# Patient Record
Sex: MALE | Race: WHITE | NOT HISPANIC OR LATINO | Employment: UNEMPLOYED | URBAN - METROPOLITAN AREA
[De-identification: names, ages, dates, MRNs, and addresses within clinical notes are randomized per-mention and may not be internally consistent; named-entity substitution may affect disease eponyms.]

---

## 2024-10-20 ENCOUNTER — APPOINTMENT (OUTPATIENT)
Dept: RADIOLOGY | Facility: HOSPITAL | Age: 59
End: 2024-10-20
Payer: MEDICAID

## 2024-10-20 ENCOUNTER — APPOINTMENT (OUTPATIENT)
Dept: CARDIOLOGY | Facility: HOSPITAL | Age: 59
End: 2024-10-20
Payer: MEDICAID

## 2024-10-20 ENCOUNTER — HOSPITAL ENCOUNTER (EMERGENCY)
Facility: HOSPITAL | Age: 59
Discharge: HOME | End: 2024-10-20
Payer: MEDICAID

## 2024-10-20 VITALS
RESPIRATION RATE: 19 BRPM | HEART RATE: 59 BPM | SYSTOLIC BLOOD PRESSURE: 120 MMHG | TEMPERATURE: 97.9 F | HEIGHT: 65 IN | BODY MASS INDEX: 24.16 KG/M2 | OXYGEN SATURATION: 97 % | WEIGHT: 145 LBS | DIASTOLIC BLOOD PRESSURE: 69 MMHG

## 2024-10-20 DIAGNOSIS — F10.90 ALCOHOL USE DISORDER: Primary | ICD-10-CM

## 2024-10-20 DIAGNOSIS — J18.9 PNEUMONIA OF LEFT LOWER LOBE DUE TO INFECTIOUS ORGANISM: ICD-10-CM

## 2024-10-20 PROBLEM — G47.00 INSOMNIA: Status: ACTIVE | Noted: 2021-10-28

## 2024-10-20 PROBLEM — T14.8XXA FRACTURE: Status: ACTIVE | Noted: 2020-01-03

## 2024-10-20 PROBLEM — M16.11 ARTHRITIS OF RIGHT HIP: Status: ACTIVE | Noted: 2018-12-07

## 2024-10-20 PROBLEM — Z96.641 PRESENCE OF RIGHT ARTIFICIAL HIP JOINT: Status: ACTIVE | Noted: 2018-12-22

## 2024-10-20 PROBLEM — F10.930 ALCOHOL WITHDRAWAL SYNDROME WITHOUT COMPLICATION (MULTI): Status: ACTIVE | Noted: 2023-10-13

## 2024-10-20 PROBLEM — B18.2 CHRONIC HEPATITIS C WITHOUT HEPATIC COMA (MULTI): Chronic | Status: ACTIVE | Noted: 2018-02-20

## 2024-10-20 PROBLEM — F10.20 ALCOHOL USE DISORDER, SEVERE, DEPENDENCE (MULTI): Status: ACTIVE | Noted: 2018-02-12

## 2024-10-20 PROBLEM — F41.1 GENERALIZED ANXIETY DISORDER: Status: ACTIVE | Noted: 2024-03-15

## 2024-10-20 PROBLEM — M75.102 LEFT ROTATOR CUFF TEAR: Status: ACTIVE | Noted: 2020-01-03

## 2024-10-20 PROBLEM — R29.6 FALLS FREQUENTLY: Status: ACTIVE | Noted: 2020-07-10

## 2024-10-20 PROBLEM — E44.1 MILD PROTEIN-CALORIE MALNUTRITION (MULTI): Chronic | Status: ACTIVE | Noted: 2019-09-19

## 2024-10-20 PROBLEM — I73.9 PERIPHERAL VASCULAR DISEASE (CMS-HCC): Status: ACTIVE | Noted: 2019-01-28

## 2024-10-20 PROBLEM — F32.4 MAJOR DEPRESSIVE DISORDER WITH SINGLE EPISODE, IN PARTIAL REMISSION (CMS-HCC): Status: ACTIVE | Noted: 2019-10-30

## 2024-10-20 PROBLEM — F17.200 NICOTINE USE DISORDER: Status: ACTIVE | Noted: 2019-09-19

## 2024-10-20 LAB
ALBUMIN SERPL BCP-MCNC: 4 G/DL (ref 3.4–5)
ALP SERPL-CCNC: 54 U/L (ref 33–120)
ALT SERPL W P-5'-P-CCNC: 6 U/L (ref 10–52)
AMMONIA PLAS-SCNC: 26 UMOL/L (ref 16–53)
AMPHETAMINES UR QL SCN: ABNORMAL
ANION GAP SERPL CALC-SCNC: 12 MMOL/L (ref 10–20)
APAP SERPL-MCNC: <10 UG/ML
APPEARANCE UR: CLEAR
APTT PPP: 36 SECONDS (ref 27–38)
AST SERPL W P-5'-P-CCNC: 28 U/L (ref 9–39)
BARBITURATES UR QL SCN: ABNORMAL
BASOPHILS # BLD AUTO: 0.08 X10*3/UL (ref 0–0.1)
BASOPHILS NFR BLD AUTO: 1.4 %
BENZODIAZ UR QL SCN: ABNORMAL
BILIRUB DIRECT SERPL-MCNC: 0 MG/DL (ref 0–0.3)
BILIRUB SERPL-MCNC: 0.3 MG/DL (ref 0–1.2)
BILIRUB UR STRIP.AUTO-MCNC: NEGATIVE MG/DL
BUN SERPL-MCNC: 9 MG/DL (ref 6–23)
BZE UR QL SCN: ABNORMAL
CALCIUM SERPL-MCNC: 8.9 MG/DL (ref 8.6–10.3)
CANNABINOIDS UR QL SCN: ABNORMAL
CHLORIDE SERPL-SCNC: 102 MMOL/L (ref 98–107)
CO2 SERPL-SCNC: 29 MMOL/L (ref 21–32)
COLOR UR: COLORLESS
CREAT SERPL-MCNC: 0.69 MG/DL (ref 0.5–1.3)
CREAT SERPL-MCNC: 0.72 MG/DL (ref 0.5–1.3)
EGFRCR SERPLBLD CKD-EPI 2021: >90 ML/MIN/1.73M*2
EGFRCR SERPLBLD CKD-EPI 2021: >90 ML/MIN/1.73M*2
EOSINOPHIL # BLD AUTO: 0.27 X10*3/UL (ref 0–0.7)
EOSINOPHIL NFR BLD AUTO: 4.8 %
ERYTHROCYTE [DISTWIDTH] IN BLOOD BY AUTOMATED COUNT: 13.4 % (ref 11.5–14.5)
ETHANOL SERPL-MCNC: 179 MG/DL
FENTANYL+NORFENTANYL UR QL SCN: ABNORMAL
GLUCOSE SERPL-MCNC: 104 MG/DL (ref 74–99)
GLUCOSE UR STRIP.AUTO-MCNC: NORMAL MG/DL
HCT VFR BLD AUTO: 43.3 % (ref 41–52)
HGB BLD-MCNC: 14.4 G/DL (ref 13.5–17.5)
HOLD SPECIMEN: NORMAL
IMM GRANULOCYTES # BLD AUTO: 0.01 X10*3/UL (ref 0–0.7)
IMM GRANULOCYTES NFR BLD AUTO: 0.2 % (ref 0–0.9)
INR PPP: 0.9 (ref 0.9–1.1)
KETONES UR STRIP.AUTO-MCNC: NEGATIVE MG/DL
LEUKOCYTE ESTERASE UR QL STRIP.AUTO: NEGATIVE
LYMPHOCYTES # BLD AUTO: 0.97 X10*3/UL (ref 1.2–4.8)
LYMPHOCYTES NFR BLD AUTO: 17.1 %
MAGNESIUM SERPL-MCNC: 2.07 MG/DL (ref 1.6–2.4)
MCH RBC QN AUTO: 33.8 PG (ref 26–34)
MCHC RBC AUTO-ENTMCNC: 33.3 G/DL (ref 32–36)
MCV RBC AUTO: 102 FL (ref 80–100)
METHADONE UR QL SCN: ABNORMAL
MONOCYTES # BLD AUTO: 0.6 X10*3/UL (ref 0.1–1)
MONOCYTES NFR BLD AUTO: 10.6 %
NEUTROPHILS # BLD AUTO: 3.75 X10*3/UL (ref 1.2–7.7)
NEUTROPHILS NFR BLD AUTO: 65.9 %
NITRITE UR QL STRIP.AUTO: NEGATIVE
NRBC BLD-RTO: 0 /100 WBCS (ref 0–0)
OPIATES UR QL SCN: ABNORMAL
OXYCODONE+OXYMORPHONE UR QL SCN: ABNORMAL
PCP UR QL SCN: ABNORMAL
PH UR STRIP.AUTO: 5.5 [PH]
PHOSPHATE SERPL-MCNC: 3.7 MG/DL (ref 2.5–4.9)
PLATELET # BLD AUTO: 300 X10*3/UL (ref 150–450)
POTASSIUM SERPL-SCNC: 4.5 MMOL/L (ref 3.5–5.3)
PROT SERPL-MCNC: 7.1 G/DL (ref 6.4–8.2)
PROT UR STRIP.AUTO-MCNC: NEGATIVE MG/DL
PROTHROMBIN TIME: 10.2 SECONDS (ref 9.8–12.8)
RBC # BLD AUTO: 4.26 X10*6/UL (ref 4.5–5.9)
RBC # UR STRIP.AUTO: NEGATIVE /UL
SALICYLATES SERPL-MCNC: <3 MG/DL
SODIUM SERPL-SCNC: 138 MMOL/L (ref 136–145)
SP GR UR STRIP.AUTO: 1.01
UROBILINOGEN UR STRIP.AUTO-MCNC: NORMAL MG/DL
WBC # BLD AUTO: 5.7 X10*3/UL (ref 4.4–11.3)

## 2024-10-20 PROCEDURE — 2550000001 HC RX 255 CONTRASTS

## 2024-10-20 PROCEDURE — 70450 CT HEAD/BRAIN W/O DYE: CPT | Performed by: RADIOLOGY

## 2024-10-20 PROCEDURE — 85610 PROTHROMBIN TIME: CPT

## 2024-10-20 PROCEDURE — 83735 ASSAY OF MAGNESIUM: CPT

## 2024-10-20 PROCEDURE — 93971 EXTREMITY STUDY: CPT

## 2024-10-20 PROCEDURE — 70450 CT HEAD/BRAIN W/O DYE: CPT

## 2024-10-20 PROCEDURE — 80307 DRUG TEST PRSMV CHEM ANLYZR: CPT

## 2024-10-20 PROCEDURE — 2500000004 HC RX 250 GENERAL PHARMACY W/ HCPCS (ALT 636 FOR OP/ED)

## 2024-10-20 PROCEDURE — 84100 ASSAY OF PHOSPHORUS: CPT

## 2024-10-20 PROCEDURE — 74177 CT ABD & PELVIS W/CONTRAST: CPT

## 2024-10-20 PROCEDURE — 74177 CT ABD & PELVIS W/CONTRAST: CPT | Performed by: RADIOLOGY

## 2024-10-20 PROCEDURE — 85730 THROMBOPLASTIN TIME PARTIAL: CPT

## 2024-10-20 PROCEDURE — 80320 DRUG SCREEN QUANTALCOHOLS: CPT

## 2024-10-20 PROCEDURE — 82140 ASSAY OF AMMONIA: CPT

## 2024-10-20 PROCEDURE — 2500000001 HC RX 250 WO HCPCS SELF ADMINISTERED DRUGS (ALT 637 FOR MEDICARE OP)

## 2024-10-20 PROCEDURE — 36415 COLL VENOUS BLD VENIPUNCTURE: CPT

## 2024-10-20 PROCEDURE — 81003 URINALYSIS AUTO W/O SCOPE: CPT

## 2024-10-20 PROCEDURE — 93005 ELECTROCARDIOGRAM TRACING: CPT

## 2024-10-20 PROCEDURE — 99285 EMERGENCY DEPT VISIT HI MDM: CPT | Mod: 25

## 2024-10-20 PROCEDURE — 96375 TX/PRO/DX INJ NEW DRUG ADDON: CPT

## 2024-10-20 PROCEDURE — 82565 ASSAY OF CREATININE: CPT | Mod: 59

## 2024-10-20 PROCEDURE — 96374 THER/PROPH/DIAG INJ IV PUSH: CPT | Mod: 59

## 2024-10-20 PROCEDURE — 84075 ASSAY ALKALINE PHOSPHATASE: CPT

## 2024-10-20 PROCEDURE — 93971 EXTREMITY STUDY: CPT | Performed by: RADIOLOGY

## 2024-10-20 PROCEDURE — 85025 COMPLETE CBC W/AUTO DIFF WBC: CPT

## 2024-10-20 RX ORDER — THIAMINE HYDROCHLORIDE 100 MG/ML
100 INJECTION, SOLUTION INTRAMUSCULAR; INTRAVENOUS DAILY
Status: DISCONTINUED | OUTPATIENT
Start: 2024-10-20 | End: 2024-10-20 | Stop reason: HOSPADM

## 2024-10-20 RX ORDER — LORAZEPAM 2 MG/ML
2 INJECTION INTRAMUSCULAR EVERY 2 HOUR PRN
Status: DISCONTINUED | OUTPATIENT
Start: 2024-10-20 | End: 2024-10-20 | Stop reason: HOSPADM

## 2024-10-20 RX ORDER — LEVOFLOXACIN 250 MG/1
750 TABLET ORAL DAILY
Qty: 7 TABLET | Refills: 0 | Status: SHIPPED | OUTPATIENT
Start: 2024-10-20 | End: 2024-10-22 | Stop reason: ALTCHOICE

## 2024-10-20 RX ORDER — LEVOFLOXACIN 250 MG/1
750 TABLET ORAL DAILY
Qty: 7 TABLET | Refills: 0 | Status: SHIPPED | OUTPATIENT
Start: 2024-10-20 | End: 2024-10-20

## 2024-10-20 RX ORDER — LORAZEPAM 2 MG/ML
0.5 INJECTION INTRAMUSCULAR EVERY 2 HOUR PRN
Status: DISCONTINUED | OUTPATIENT
Start: 2024-10-20 | End: 2024-10-20 | Stop reason: HOSPADM

## 2024-10-20 RX ORDER — SILDENAFIL 50 MG/1
50 TABLET, FILM COATED ORAL AS NEEDED
COMMUNITY

## 2024-10-20 RX ORDER — OMEPRAZOLE 20 MG/1
1 CAPSULE, DELAYED RELEASE ORAL DAILY
COMMUNITY

## 2024-10-20 RX ORDER — CARBIDOPA AND LEVODOPA 25; 100 MG/1; MG/1
2 TABLET ORAL 3 TIMES DAILY
COMMUNITY

## 2024-10-20 RX ORDER — TIOTROPIUM BROMIDE INHALATION SPRAY 3.12 UG/1
2 SPRAY, METERED RESPIRATORY (INHALATION) DAILY
COMMUNITY
End: 2024-10-22 | Stop reason: SDUPTHER

## 2024-10-20 RX ORDER — FOLIC ACID 1 MG/1
1 TABLET ORAL DAILY
Status: DISCONTINUED | OUTPATIENT
Start: 2024-10-20 | End: 2024-10-20 | Stop reason: HOSPADM

## 2024-10-20 RX ORDER — PHENOBARBITAL SODIUM 65 MG/ML
130 INJECTION, SOLUTION INTRAMUSCULAR; INTRAVENOUS
Status: DISCONTINUED | OUTPATIENT
Start: 2024-10-20 | End: 2024-10-20

## 2024-10-20 RX ORDER — MULTIVIT-MIN/IRON FUM/FOLIC AC 7.5 MG-4
1 TABLET ORAL DAILY
Status: DISCONTINUED | OUTPATIENT
Start: 2024-10-20 | End: 2024-10-20 | Stop reason: HOSPADM

## 2024-10-20 RX ORDER — PHENOBARBITAL SODIUM 65 MG/ML
260 INJECTION, SOLUTION INTRAMUSCULAR; INTRAVENOUS ONCE
Status: DISCONTINUED | OUTPATIENT
Start: 2024-10-20 | End: 2024-10-20

## 2024-10-20 RX ORDER — LANOLIN ALCOHOL/MO/W.PET/CERES
100 CREAM (GRAM) TOPICAL DAILY
Status: DISCONTINUED | OUTPATIENT
Start: 2024-10-23 | End: 2024-10-20 | Stop reason: HOSPADM

## 2024-10-20 RX ORDER — ALBUTEROL SULFATE 90 UG/1
2 INHALANT RESPIRATORY (INHALATION) EVERY 4 HOURS PRN
COMMUNITY

## 2024-10-20 RX ORDER — LORAZEPAM 2 MG/ML
1 INJECTION INTRAMUSCULAR EVERY 2 HOUR PRN
Status: DISCONTINUED | OUTPATIENT
Start: 2024-10-20 | End: 2024-10-20 | Stop reason: HOSPADM

## 2024-10-20 SDOH — HEALTH STABILITY: MENTAL HEALTH: BEHAVIORAL HEALTH(WDL): WITHIN DEFINED LIMITS

## 2024-10-20 ASSESSMENT — PAIN - FUNCTIONAL ASSESSMENT: PAIN_FUNCTIONAL_ASSESSMENT: 0-10

## 2024-10-20 ASSESSMENT — LIFESTYLE VARIABLES
TREMOR: NO TREMOR
PAROXYSMAL SWEATS: BARELY PERCEPTIBLE SWEATING, PALMS MOIST
VISUAL DISTURBANCES: NOT PRESENT
NAUSEA AND VOMITING: NO NAUSEA AND NO VOMITING
AGITATION: NORMAL ACTIVITY
HEADACHE, FULLNESS IN HEAD: MODERATE
NAUSEA AND VOMITING: MILD NAUSEA WITH NO VOMITING
ORIENTATION AND CLOUDING OF SENSORIUM: ORIENTED AND CAN DO SERIAL ADDITIONS
VISUAL DISTURBANCES: NOT PRESENT
AUDITORY DISTURBANCES: NOT PRESENT
AUDITORY DISTURBANCES: NOT PRESENT
TREMOR: NO TREMOR
ANXIETY: 3
HEADACHE, FULLNESS IN HEAD: VERY MILD
PAROXYSMAL SWEATS: BARELY PERCEPTIBLE SWEATING, PALMS MOIST
AGITATION: SOMEWHAT MORE THAN NORMAL ACTIVITY
ANXIETY: NO ANXIETY, AT EASE
TOTAL SCORE: 9
TOTAL SCORE: 2
ORIENTATION AND CLOUDING OF SENSORIUM: ORIENTED AND CAN DO SERIAL ADDITIONS

## 2024-10-20 ASSESSMENT — COLUMBIA-SUICIDE SEVERITY RATING SCALE - C-SSRS
6. HAVE YOU EVER DONE ANYTHING, STARTED TO DO ANYTHING, OR PREPARED TO DO ANYTHING TO END YOUR LIFE?: NO
2. HAVE YOU ACTUALLY HAD ANY THOUGHTS OF KILLING YOURSELF?: NO
1. IN THE PAST MONTH, HAVE YOU WISHED YOU WERE DEAD OR WISHED YOU COULD GO TO SLEEP AND NOT WAKE UP?: NO

## 2024-10-20 ASSESSMENT — PAIN SCALES - GENERAL: PAINLEVEL_OUTOF10: 10 - WORST POSSIBLE PAIN

## 2024-10-20 NOTE — ED PROVIDER NOTES
THIS IS MY RESIDENT SUPERVISORY AND SHARED VISIT NOTE:    I personally saw the patient and made/approved the management plan and take responsibility for the patient management.    History: Patient is a 59-year-old male presents today with a chief complaint of leg swelling, alcohol problem.  Patient is concerned that his trouble with his alcohol usage, he states that he found his friend dead a week ago, this is a wake-up call for him.  Patient is his left leg is also warm and painful to touch, states he drinks approximately 2412 ounce peers per day,/drinks around 30 minutes prior to arrival.  Patient has nausea and vomiting, states he does have a history of seizures from drinking, has been inpatient before, he is amenable to inpatient again.    Exam: GENERAL APPEARANCE: Awake and alert.     HEENT: Normocephalic, atraumatic. Extraocular muscles are intact. Pupils equal round and reactive to light.  CHEST: Nontender to palpation. Clear to auscultation bilaterally. No rales, rhonchi, or wheezing.   HEART: S1, S2. Regular rate and rhythm. No murmurs, gallops or rubs.  Strong and equal pulses in the extremities.   ABDOMEN: Soft,.  Generalized tenderness, rebound near right lower quadrant., bowel sounds normal x 4 quadrants  NEUROLOGIC patient CBC shows alternative treatment: Patient's hemoglobin 14.4, hematocrit 43.3, patient has drug screen presumptive positive for cannabinoids, hepatic function panel shows ALT of 6, AST of 28, BMP with glucose 104, urinalysis negative, AL: Awake, alert and oriented x 3.       MDM: Patient seen and evaluated at bedside, patient is in no acute distress.  I will order a CBC, CMP, CIWA protocol, CT head, CT abdomen pelvis, EKG, magnesium, phosphorus, coags, ultrasound. Differential diagnosis includes but is not limited to DVT, alcohol intoxication, viral gastroenteritis, cirrhosis.  Patient CBC shows hemoglobin 14.4, hematocrit 43.3, LFTs show ALT of 6, AST of 28, BMP shows glucose 104,  patient drug screen presumptive positive for cannabinoids, alcohol 179,  Patient is excepted to [facility] we will discharge him today.  Patient has no signs of withdrawal at this time.           Please see resident note for further details    Sections of this report were created using voice-to-text technology and may contain errors in translation    Anirudh Reilly DO  Emergency Medicine       Anirudh Reilly DO  10/20/24 1500

## 2024-10-20 NOTE — ED PROVIDER NOTES
Emergency Department Provider Note        History of Present Illness     History provided by: Patient  Limitations to History: None  External Records Reviewed with Brief Summary: None    HPI:  Arjun Ocampo is a 59 y.o. male history of use disorder with previous withdrawal, Parkinson's on Sinemet, PTSD, prior left-sided knee replacement, COPD presenting with a chief complaint of left leg pain and alcohol detox.  Patient states that he has been drinking since he was 10 years old, currently drinking 24 beers per day.  Patient states he had a friend passed and he wants to make a life change.  His last drink was about an hour prior to arrival.  Patient endorses nausea, vomiting, diaphoresis.  Patient also endorses that his left leg has been swelling for the last week.  He states prior to today he denies fever, chills, trauma to the leg or frequent falls.  Also endorses right abdominal pain for the last 3 weeks without dysuria, hematuria, nausea vomiting or diarrhea.    Physical Exam   Triage vitals:  T 36.6 °C (97.9 °F)  HR 79  /69  RR 18  O2 96 % None (Room air)    General: Awake, alert, in no acute distress  Eyes: Gaze conjugate.  No scleral icterus or injection  HENT: Normo-cephalic, atraumatic. No stridor  CV: Regular rate, regular rhythm. Radial pulses 2+ bilaterally. Palpable DP b/l   Resp: Breathing non-labored, speaking in full sentences.  Clear to auscultation bilaterally  GI: Soft, non-distended, mild right lower abdominal tenderness. No rebound or guarding.  MSK/Extremities: No gross bony deformities. Left lower extremity edema below the knee with venous changes. Mild erythema and warmth of the entire leg below the calf. Tenderness to palpation anteriorly above an inch inferior to the knee. Prior surgical incision linearly over the knee. No significant joint swelling, erythema, or tenderness to the joint.   Skin: Warm. Appropriate color  Neuro: Alert. Oriented x3. Face symmetric. Speech is  fluent.  Left hand with a pill-rolling tremor.  Psych: Anxious    Medical Decision Making & ED Course   Medical Decision Makin y.o. male with history of alcohol use disorder with withdrawal, Parkinson's, left knee replacement, COPD presenting for left leg swelling alcohol detox.  On arrival he is hemodynamically stable, afebrile, saturating well on room air and anxious.  On my initial evaluation, nursing states that he just had generalized tonic-clonic activity prior to me walking into the room.  On my exam, patient is neurologically intact, oriented, answering questions appropriately and does not appear postictal.  He does have a left-sided tremor that is baseline from his Parkinson's.  Patient states that his last drink was an hour prior to arrival so withdrawal would be unlikely. Initial CIWA score is 9.  Patient is also complaining of left leg pain with swelling and erythema.  History of patient does have significant pain to including tenderness inferior to the knee but no warmth, erythema tenderness or swelling to the joint itself.  The leg is warm and well-perfused with palpable pulses.  My suspicion for limb ischemia or arthritis is low.  Will order a DVT ultrasound.  Will also order a CT head given the possible seizure activity pelvis for right sided abdominal pain.  Patient's 6-hour stay in the emergency department, there was no additional seizure-like activity noted.  Patient's imaging was all manage for the most part unremarkable except for an incidental finding of a possible left lung base infiltrate concerning for pneumonia versus a neoplastic process.  Patient was started on Levaquin.  I discussed that this was concerning for a nodule versus a neoplastic process that he needs close follow-up with pulmonology to make sure that this resolves.  He was given a referral for pulmonology.  Additionally after his negative workup and negative lab work, [facility] was engaged.  [facility] spoke with myself and the  patient multiple times.  Patient is agreeable to go to placement with [facility].  Was discharged with a paper prescription for Levaquin and will stay in the emergency department until he has disposition with [facility].  ----     Social Determinants of Health which Significantly Impact Care: Substance use disorder The following actions were taken to address these social determinants: Patient offered evaluation by [facility]    EKG Independent Interpretation: EKG interpreted by myself. Please see ED Course for full interpretation.    Independent Result Review and Interpretation: Relevant laboratory and radiographic results were reviewed and independently interpreted by myself.  As necessary, they are commented on in the ED Course.    Chronic conditions affecting the patient's care: As documented above in [facility]    The patient was discussed with the following consultants/services:  [facility]    Care Considerations: As documented above in [facility]    ED Course:  ED Course as of 10/20/24 1444   Sun Oct 20, 2024   0950 ECG 12 Lead  EKG independently interpreted by myself which shows sinus rhythm with a rate of 73 bpm, normal [place] and QTc interval.  No ST elevations or depressions concerning for ischemia.  No notable T wave inversions. [AW]   1006 Hepatic function panel(!)  Normal hepatic function panel and [AW]   1006 Basic Metabolic Panel(!)  Reassuring BMP with normal sodium, potassium, normal kidney function [AW]   1006 MAGNESIUM: 2.07 [AW]   1006 PHOSPHORUS: 3.7 [AW]   1015 Alcohol(!): 179  Otherwise negative acute tox panel [AW]   1034 CT head wo IV contrast  1. No acute intracranial abnormality  2. Multiple erosions and cystic changes of the odontoid and anterior arch of C1 can be seen with rheumatoid arthritis.  3. Extensive chronic paranasal sinus inflammatory changes   [AW]   1058 Cannabinoid Screen, Urine(!): Presumptive Positive [AW]   1058 Urinalysis with Reflex Culture and Microscopic(!)  Negative for infection [AW]   1058 Lower  extremity venous duplex left  No sign of deep venous thrombus in the left lower extremity. [AW]   1206 CT abdomen pelvis w IV contrast  1.  Left lung base nodular infiltrate which may represent developing pneumonia/pneumonitis, however, neoplastic process in the appropriate clinical settings may have similar appearance. Recommend clinical correlation and follow-up to document resolution.  2. No acute intra-abdominal process accounting for limitation of this exam. No bowel obstruction or free air.  3. Additional detailed findings as above.       [AW]   1221 Patient is medically clear for substance use treatment placement [AW]      ED Course User Index  [AW] Mae Villasenor DO         Diagnoses as of 10/20/24 1444   Alcohol use disorder   Pneumonia of left lower lobe due to infectious organism     Disposition   As a result of the work-up, the patient was discharged home.  he was informed of his diagnosis and instructed to come back with any concerns or worsening of condition.  he and was agreeable to the plan as discussed above.  he was given the opportunity to ask questions.  All of the patient's questions were answered.    Procedures   Procedures    Patient seen and discussed with ED attending physician.    Mae Villasenor DO  Emergency Medicine       Mae Villasenor DO  Resident  10/20/24 1442

## 2024-10-20 NOTE — DISCHARGE INSTRUCTIONS
You were found to have a pulmonary nodule on your CT images. Please follow up with your primary care physician and lung nodule clinic for surveillance of this incidental finding. If you do not have a primary care doctor you may call [phone] to make an appointment.

## 2024-10-20 NOTE — ED TRIAGE NOTES
Pt presents to department with desire for placement for alcohol rehab and L leg swelling for the past week. Leg is warm and painful to the touch and red. Pt states he found is friend dead last week from alcohol and wants to make life change. Pt states he drinks 24 40oz beers a day. Last drink was approx. 30 min ago.

## 2024-10-22 ENCOUNTER — APPOINTMENT (OUTPATIENT)
Dept: PULMONOLOGY | Facility: CLINIC | Age: 59
End: 2024-10-22
Payer: MEDICAID

## 2024-10-22 VITALS
TEMPERATURE: 98.4 F | HEIGHT: 62 IN | BODY MASS INDEX: 35.63 KG/M2 | OXYGEN SATURATION: 98 % | HEART RATE: 67 BPM | WEIGHT: 193.6 LBS | DIASTOLIC BLOOD PRESSURE: 85 MMHG | SYSTOLIC BLOOD PRESSURE: 151 MMHG

## 2024-10-22 DIAGNOSIS — F17.218 CIGARETTE NICOTINE DEPENDENCE WITH OTHER NICOTINE-INDUCED DISORDER: ICD-10-CM

## 2024-10-22 DIAGNOSIS — J41.8 MIXED SIMPLE AND MUCOPURULENT CHRONIC BRONCHITIS (MULTI): Primary | ICD-10-CM

## 2024-10-22 DIAGNOSIS — J18.9 PNEUMONIA OF LEFT LOWER LOBE DUE TO INFECTIOUS ORGANISM: ICD-10-CM

## 2024-10-22 LAB
ATRIAL RATE: 74 BPM
P AXIS: 79 DEGREES
PR INTERVAL: 141 MS
Q ONSET: 252 MS
QRS COUNT: 12 BEATS
QRS DURATION: 90 MS
QT INTERVAL: 381 MS
QTC CALCULATION(BAZETT): 420 MS
QTC FREDERICIA: 407 MS
R AXIS: 78 DEGREES
T AXIS: 65 DEGREES
T OFFSET: 443 MS
VENTRICULAR RATE: 73 BPM

## 2024-10-22 PROCEDURE — 99244 OFF/OP CNSLTJ NEW/EST MOD 40: CPT | Performed by: INTERNAL MEDICINE

## 2024-10-22 RX ORDER — TIOTROPIUM BROMIDE INHALATION SPRAY 3.12 UG/1
2 SPRAY, METERED RESPIRATORY (INHALATION) DAILY
Qty: 4 G | Refills: 6 | Status: SHIPPED | OUTPATIENT
Start: 2024-10-22 | End: 2025-10-22

## 2024-10-22 RX ORDER — LEVOFLOXACIN 750 MG/1
750 TABLET ORAL DAILY
Qty: 5 TABLET | Refills: 0 | Status: SHIPPED | OUTPATIENT
Start: 2024-10-22 | End: 2024-10-27

## 2024-10-28 NOTE — PROGRESS NOTES
Department of Medicine  Division of Pulmonary, Critical Care, and Sleep Medicine  Consultation  [facility] - [address], [address]    I was asked by Anirudh Reilly DO, to evaluate Mr. Arjun Ocampo for Lung nodule. I have independently interviewed and examined the patient in the office and reviewed available records.    Physician HPI:  Mr. Ocampo is a 59-year-old man with past medical history significant for nicotine dependence and alcohol abuse who presented to the office today to follow-up after recent ER visit.  The patient presented to the ER recently with lower extremity swelling and abdominal pain.  CT scan of abdomen was done which revealed a nodule like opacity in the left lower lobe.  He was eventually discharged home with a course of levofloxacin though he reports that he could not get the antibiotic as the prescription was not sent right to the pharmacy.  Since that time he developed worsening cough with greenish sputum production.  He denies acute nausea or vomiting.  He is in a rehab program for alcohol abuse at this time.  No acute fevers.  Oxygenation has been stable on room air.  He continues to smoke cigarettes daily.  No past history of pulmonary disease or recurrent sinopulmonary infections.  Denies family history of lung cancer.    Review of Systems   All other review of systems are negative and/or non-contributory.      Immunizations:    There is no immunization history on file for this patient.    Past Medical History:  Past Medical History:   Diagnosis Date    Asthma     CHF (congestive heart failure)     COPD (chronic obstructive pulmonary disease) (Multi)     Parkinson disease (Multi)     Prostate CA (Multi)        Past Surgical History:  No past surgical history on file.    Family History:  No family history on file.    Social History:  Social History     Tobacco Use    Smoking status: Every Day     Current packs/day: 1.00     Types: Cigarettes     Passive exposure: Current     "Smokeless tobacco: Never   Vaping Use    Vaping status: Never Used   Substance Use Topics    Alcohol use: Not Currently     Alcohol/week: 960.0 standard drinks of alcohol     Types: 960 Cans of beer per week     Comment: 24 40oz cans of beer a day    Drug use: Not Currently     Comment: marijuana        Occupational & Environmental History:     Medications:  Current Outpatient Medications   Medication Instructions    albuterol 90 mcg/actuation inhaler 2 puffs, Every 4 hours PRN    carbidopa-levodopa (Sinemet)  mg tablet 2 tablets, 3 times daily    levoFLOXacin (LEVAQUIN) 750 mg, oral, Daily    omeprazole (PriLOSEC) 20 mg DR capsule 1 capsule, Daily    sildenafil (VIAGRA) 50 mg, As needed    Spiriva Respimat 2.5 mcg/actuation inhaler 2 puffs, inhalation, Daily        Drug Allergies/Intolerances:  Allergies   Allergen Reactions    Chocolate Anaphylaxis    Cocoa Anaphylaxis    Penicillins Anaphylaxis    Risperidone Hives, Shortness of breath and Swelling            Visit Vitals  /85 (BP Location: Left arm, Patient Position: Sitting, BP Cuff Size: Large adult)   Pulse 67   Temp 36.9 °C (98.4 °F) (Temporal)   Ht 1.575 m (5' 2\")   Wt 87.8 kg (193 lb 9.6 oz)   SpO2 98%   BMI 35.41 kg/m²   Smoking Status Every Day   BSA 1.96 m²        Physical Exam  Vitals and nursing note reviewed.   Constitutional:       General: He is not in acute distress.     Appearance: Normal appearance.   HENT:      Head: Normocephalic and atraumatic.      Nose: Nose normal.      Mouth/Throat:      Mouth: Mucous membranes are moist.   Eyes:      Conjunctiva/sclera: Conjunctivae normal.   Cardiovascular:      Rate and Rhythm: Normal rate and regular rhythm.   Pulmonary:      Effort: Pulmonary effort is normal. No respiratory distress.      Breath sounds: Normal breath sounds. No stridor. No wheezing or rhonchi.   Musculoskeletal:      Cervical back: Normal range of motion and neck supple.   Skin:     General: Skin is warm and dry.      " Coloration: Skin is not jaundiced.   Neurological:      General: No focal deficit present.      Mental Status: He is alert and oriented to person, place, and time. Mental status is at baseline.   Psychiatric:         Mood and Affect: Mood normal.         Behavior: Behavior normal.         Judgment: Judgment normal.          Pulmonary Function Test Results       Chest Radiograph     XR chest 1 view 10/16/2023    Narrative  EXAMINATION: XR CHEST AP OR PA 1 VIEW 10/16/2023 10:07 AM  CLINICAL HISTORY: Cough; productive cough with black phlegm  ASSOCIATED DIAGNOSIS: Cough  productive cough with black phlegm  ORDERING PROVIDER: EUFEMIA NORWOOD  TECHNOLOGISTS NOTE:    COMPARISON: XR CHEST PA+LAT 5/13/2019, 1:53 PM    FINDINGS:  Lines, tubes, and devices: None.    Lungs and pleura: Lung volumes are diminished. No focal consolidation, pleural effusion, or pneumothorax.    Cardiomediastinal silhouette: Within normal limits.    Musculoskeletal: Partial visualization of lower cervical ACDF. Advanced multilevel degenerative disc space narrowing and endplate spurring in the mid lower thoracic spine. Thoracolumbar DISH.    IMPRESSION:  Low lung volumes. No acute radiographic findings.    MACRO: None      Echocardiogram     No results found for this or any previous visit from the past 365 days.       Chest CT Scan     No results found for this or any previous visit from the past 365 days.       Laboratory Studies     Lab Results   Component Value Date    WBC 5.7 10/20/2024    HGB 14.4 10/20/2024    HCT 43.3 10/20/2024     (H) 10/20/2024     10/20/2024      Lab Results   Component Value Date    GLUCOSE 104 (H) 10/20/2024    CALCIUM 8.9 10/20/2024     10/20/2024    K 4.5 10/20/2024    CO2 29 10/20/2024     10/20/2024    BUN 9 10/20/2024    CREATININE 0.69 10/20/2024      Lab Results   Component Value Date    ALT 6 (L) 10/20/2024    AST 28 10/20/2024    ALKPHOS 54 10/20/2024    BILITOT 0.3 10/20/2024         Protime   Date/Time Value Ref Range Status   10/20/2024 10:25 AM 10.2 9.8 - 12.8 seconds Final     INR   Date/Time Value Ref Range Status   10/20/2024 10:25 AM 0.9 0.9 - 1.1 Final       Assessment and Plan / Recommendations     LLL nodule - likely infectious / inflammatory   Nicotine dependence  Chronic bronchitis  ETOH abuse    Plan:  Given progressive cough and thick sputum production, I advised to proceed with 5-day course of antibiotic for possible commune acquired pneumonia  Smoking cessation counseling is discussed today  LDCT for lung ca screening - Will be scheduled 6-8 weeks from now.   Albuterol as needed  PFTs prior to next visit     F/U in 2 months.     Please excuse any misspellings or unintended errors related to the Dragon speech recognition software used to dictate this note.    Tierra Montano MD  10/22/2024

## 2024-11-12 ENCOUNTER — APPOINTMENT (OUTPATIENT)
Dept: RADIOLOGY | Facility: HOSPITAL | Age: 59
DRG: 897 | End: 2024-11-12
Payer: MEDICARE

## 2024-11-12 ENCOUNTER — APPOINTMENT (OUTPATIENT)
Dept: CARDIOLOGY | Facility: HOSPITAL | Age: 59
DRG: 897 | End: 2024-11-12
Payer: MEDICARE

## 2024-11-12 ENCOUNTER — HOSPITAL ENCOUNTER (INPATIENT)
Facility: HOSPITAL | Age: 59
DRG: 897 | End: 2024-11-12
Attending: EMERGENCY MEDICINE | Admitting: INTERNAL MEDICINE
Payer: MEDICARE

## 2024-11-12 DIAGNOSIS — Z78.9 ALCOHOL USE: Primary | ICD-10-CM

## 2024-11-12 DIAGNOSIS — F17.200 NICOTINE USE DISORDER: ICD-10-CM

## 2024-11-12 DIAGNOSIS — F10.221: ICD-10-CM

## 2024-11-12 LAB
ALBUMIN SERPL BCP-MCNC: 4.3 G/DL (ref 3.4–5)
ALP SERPL-CCNC: 80 U/L (ref 33–120)
ALT SERPL W P-5'-P-CCNC: 10 U/L (ref 10–52)
ANION GAP SERPL CALC-SCNC: 13 MMOL/L (ref 10–20)
AST SERPL W P-5'-P-CCNC: 25 U/L (ref 9–39)
BASOPHILS # BLD AUTO: 0.07 X10*3/UL (ref 0–0.1)
BASOPHILS NFR BLD AUTO: 0.7 %
BILIRUB SERPL-MCNC: 0.6 MG/DL (ref 0–1.2)
BUN SERPL-MCNC: 6 MG/DL (ref 6–23)
CALCIUM SERPL-MCNC: 9.2 MG/DL (ref 8.6–10.3)
CHLORIDE SERPL-SCNC: 102 MMOL/L (ref 98–107)
CO2 SERPL-SCNC: 28 MMOL/L (ref 21–32)
CREAT SERPL-MCNC: 0.73 MG/DL (ref 0.5–1.3)
EGFRCR SERPLBLD CKD-EPI 2021: >90 ML/MIN/1.73M*2
EOSINOPHIL # BLD AUTO: 0.37 X10*3/UL (ref 0–0.7)
EOSINOPHIL NFR BLD AUTO: 3.7 %
ERYTHROCYTE [DISTWIDTH] IN BLOOD BY AUTOMATED COUNT: 13.2 % (ref 11.5–14.5)
GLUCOSE SERPL-MCNC: 94 MG/DL (ref 74–99)
HCT VFR BLD AUTO: 43.4 % (ref 41–52)
HGB BLD-MCNC: 14.8 G/DL (ref 13.5–17.5)
IMM GRANULOCYTES # BLD AUTO: 0.02 X10*3/UL (ref 0–0.7)
IMM GRANULOCYTES NFR BLD AUTO: 0.2 % (ref 0–0.9)
LIPASE SERPL-CCNC: 28 U/L (ref 9–82)
LYMPHOCYTES # BLD AUTO: 1.52 X10*3/UL (ref 1.2–4.8)
LYMPHOCYTES NFR BLD AUTO: 15.4 %
MCH RBC QN AUTO: 32.7 PG (ref 26–34)
MCHC RBC AUTO-ENTMCNC: 34.1 G/DL (ref 32–36)
MCV RBC AUTO: 96 FL (ref 80–100)
MONOCYTES # BLD AUTO: 0.83 X10*3/UL (ref 0.1–1)
MONOCYTES NFR BLD AUTO: 8.4 %
NEUTROPHILS # BLD AUTO: 7.06 X10*3/UL (ref 1.2–7.7)
NEUTROPHILS NFR BLD AUTO: 71.6 %
NRBC BLD-RTO: 0 /100 WBCS (ref 0–0)
PLATELET # BLD AUTO: 274 X10*3/UL (ref 150–450)
POTASSIUM SERPL-SCNC: 3.8 MMOL/L (ref 3.5–5.3)
PROT SERPL-MCNC: 7.1 G/DL (ref 6.4–8.2)
RBC # BLD AUTO: 4.53 X10*6/UL (ref 4.5–5.9)
SODIUM SERPL-SCNC: 139 MMOL/L (ref 136–145)
WBC # BLD AUTO: 9.9 X10*3/UL (ref 4.4–11.3)

## 2024-11-12 PROCEDURE — 93005 ELECTROCARDIOGRAM TRACING: CPT

## 2024-11-12 PROCEDURE — 2500000001 HC RX 250 WO HCPCS SELF ADMINISTERED DRUGS (ALT 637 FOR MEDICARE OP): Performed by: EMERGENCY MEDICINE

## 2024-11-12 PROCEDURE — 96374 THER/PROPH/DIAG INJ IV PUSH: CPT

## 2024-11-12 PROCEDURE — 36415 COLL VENOUS BLD VENIPUNCTURE: CPT | Performed by: EMERGENCY MEDICINE

## 2024-11-12 PROCEDURE — 2500000004 HC RX 250 GENERAL PHARMACY W/ HCPCS (ALT 636 FOR OP/ED): Performed by: EMERGENCY MEDICINE

## 2024-11-12 PROCEDURE — 83690 ASSAY OF LIPASE: CPT | Performed by: EMERGENCY MEDICINE

## 2024-11-12 PROCEDURE — 2500000005 HC RX 250 GENERAL PHARMACY W/O HCPCS: Performed by: EMERGENCY MEDICINE

## 2024-11-12 PROCEDURE — 82077 ASSAY SPEC XCP UR&BREATH IA: CPT | Performed by: EMERGENCY MEDICINE

## 2024-11-12 PROCEDURE — 85025 COMPLETE CBC W/AUTO DIFF WBC: CPT | Performed by: EMERGENCY MEDICINE

## 2024-11-12 PROCEDURE — 2550000001 HC RX 255 CONTRASTS: Performed by: EMERGENCY MEDICINE

## 2024-11-12 PROCEDURE — 74177 CT ABD & PELVIS W/CONTRAST: CPT

## 2024-11-12 PROCEDURE — 80053 COMPREHEN METABOLIC PANEL: CPT | Performed by: EMERGENCY MEDICINE

## 2024-11-12 PROCEDURE — 74177 CT ABD & PELVIS W/CONTRAST: CPT | Mod: FOREIGN READ | Performed by: RADIOLOGY

## 2024-11-12 PROCEDURE — 99285 EMERGENCY DEPT VISIT HI MDM: CPT | Mod: 25

## 2024-11-12 PROCEDURE — 73080 X-RAY EXAM OF ELBOW: CPT | Mod: RIGHT SIDE | Performed by: RADIOLOGY

## 2024-11-12 PROCEDURE — 73080 X-RAY EXAM OF ELBOW: CPT | Mod: RT

## 2024-11-12 RX ORDER — LANOLIN ALCOHOL/MO/W.PET/CERES
100 CREAM (GRAM) TOPICAL DAILY
Status: DISCONTINUED | OUTPATIENT
Start: 2024-11-12 | End: 2024-11-13

## 2024-11-12 RX ORDER — ONDANSETRON 4 MG/1
4 TABLET, FILM COATED ORAL ONCE
Status: COMPLETED | OUTPATIENT
Start: 2024-11-12 | End: 2024-11-12

## 2024-11-12 RX ORDER — MULTIVIT-MIN/IRON FUM/FOLIC AC 7.5 MG-4
1 TABLET ORAL DAILY
Status: DISCONTINUED | OUTPATIENT
Start: 2024-11-12 | End: 2024-11-18 | Stop reason: HOSPADM

## 2024-11-12 RX ORDER — HYDROXYZINE HYDROCHLORIDE 25 MG/1
50 TABLET, FILM COATED ORAL ONCE
Status: COMPLETED | OUTPATIENT
Start: 2024-11-12 | End: 2024-11-12

## 2024-11-12 RX ORDER — FOLIC ACID 1 MG/1
1 TABLET ORAL DAILY
Status: DISCONTINUED | OUTPATIENT
Start: 2024-11-12 | End: 2024-11-18 | Stop reason: HOSPADM

## 2024-11-12 RX ORDER — FAMOTIDINE 10 MG/ML
20 INJECTION INTRAVENOUS ONCE
Status: COMPLETED | OUTPATIENT
Start: 2024-11-12 | End: 2024-11-12

## 2024-11-12 RX ORDER — IBUPROFEN 600 MG/1
600 TABLET ORAL ONCE
Status: COMPLETED | OUTPATIENT
Start: 2024-11-12 | End: 2024-11-12

## 2024-11-12 RX ORDER — DIAZEPAM 5 MG/1
10 TABLET ORAL EVERY 2 HOUR PRN
Status: DISCONTINUED | OUTPATIENT
Start: 2024-11-12 | End: 2024-11-18 | Stop reason: HOSPADM

## 2024-11-12 ASSESSMENT — LIFESTYLE VARIABLES
HAVE PEOPLE ANNOYED YOU BY CRITICIZING YOUR DRINKING: YES
AGITATION: SOMEWHAT MORE THAN NORMAL ACTIVITY
AUDITORY DISTURBANCES: NOT PRESENT
PULSE: 108
PAROXYSMAL SWEATS: NO SWEAT VISIBLE
HAVE YOU EVER FELT YOU SHOULD CUT DOWN ON YOUR DRINKING: YES
ANXIETY: MILDLY ANXIOUS
EVER FELT BAD OR GUILTY ABOUT YOUR DRINKING: YES
TOTAL SCORE: 4
BLOOD PRESSURE: 131/70
NAUSEA AND VOMITING: NO NAUSEA AND NO VOMITING
HEADACHE, FULLNESS IN HEAD: MODERATE
ORIENTATION AND CLOUDING OF SENSORIUM: ORIENTED AND CAN DO SERIAL ADDITIONS
TOTAL SCORE: 5
TREMOR: NO TREMOR
EVER HAD A DRINK FIRST THING IN THE MORNING TO STEADY YOUR NERVES TO GET RID OF A HANGOVER: YES
VISUAL DISTURBANCES: NOT PRESENT

## 2024-11-12 ASSESSMENT — COLUMBIA-SUICIDE SEVERITY RATING SCALE - C-SSRS
1. IN THE PAST MONTH, HAVE YOU WISHED YOU WERE DEAD OR WISHED YOU COULD GO TO SLEEP AND NOT WAKE UP?: NO
2. HAVE YOU ACTUALLY HAD ANY THOUGHTS OF KILLING YOURSELF?: NO
6. HAVE YOU EVER DONE ANYTHING, STARTED TO DO ANYTHING, OR PREPARED TO DO ANYTHING TO END YOUR LIFE?: NO

## 2024-11-12 ASSESSMENT — PAIN - FUNCTIONAL ASSESSMENT: PAIN_FUNCTIONAL_ASSESSMENT: UNABLE TO SELF-REPORT

## 2024-11-12 NOTE — ED TRIAGE NOTES
Nah[facility] was called as he was not acting right at [facility]. He admits that he sstarted drinking again and drinks 30 beers per day and was drinking today. He wants detox

## 2024-11-12 NOTE — ED PROVIDER NOTES
Emergency Department Provider Note        History of Present Illness     History provided by: Patient  Limitations to History: None  External Records Reviewed with Brief Summary: None    HPI:  Arjun Ocampo is a 59 y.o. male presents to the ED with multiple concerns.  He admits to alcohol use today.  He reports that this was due to him finding out his significant other has been unfaithful.  He reports multiple years of sobriety.  Per chart review however he has recent ER visit for alcohol use.  He also reports chronic left lower extremity pain and swelling.  He has a history of left knee surgery.  Denies injury.  He reports a fall today striking his right elbow.  He is also complaining abdominal pain.    Physical Exam   Triage vitals:  T 37 °C (98.6 °F)  HR 87  /79  RR 18  O2 (!) 92 % None (Room air)    General: Awake, alert, in no acute distress  Eyes: Gaze conjugate.  No scleral icterus or injection  HENT: Normo-cephalic, atraumatic. No stridor  CV: Regular rate, regular rhythm. Radial pulses 2+ bilaterally  Resp: Breathing non-labored, speaking in full sentences.  Clear to auscultation bilaterally  GI: Soft, non-distended, non-tender. No rebound or guarding.  : Deferred  MSK/Extremities: No gross bony deformities. Moving all extremities  Skin: Warm. Appropriate color  Neuro: Alert. Oriented. Face symmetric. Speech is fluent.  Gross strength and sensation intact in b/l UE and LEs  Psych: Appropriate mood and affect    Medical Decision Making & ED Course   Medical Decision Makin y.o. male presents to the ED for alcohol intoxication and reported fall.  Upon arrival to the ED he is in no acute distress.  Obtained imaging which is negative for acute injuries.  He was started on CIWA protocol with concern for developing alcohol withdrawal.  He will be hospitalized on CIWA protocol.  ----      Differential diagnoses considered include but are not limited to: See MDM     Social Determinants of  Health which Significantly Impact Care: Substance use disorder     EKG Independent Interpretation:  Sinus rhythm rate 85.  Evidence of early repolarization.  This is unchanged from previous EKG from 1024.  Otherwise no evidence of acute ischemia.  No T wave abnormalities.  Normal intervals normal axis.    Independent Result Review and Interpretation: Relevant laboratory and radiographic results were reviewed and independently interpreted by myself.  As necessary, they are commented on in the ED Course.    Chronic conditions affecting the patient's care: As documented above in [facility]    The patient was discussed with the following consultants/services: None    Care Considerations: As documented above in [facility]    ED Course:  Diagnoses as of 11/18/24 1854   Alcohol use     Disposition   As a result of their workup, the patient will require admission to the hospital.  The patient was informed of his diagnosis.  The patient was given the opportunity to ask questions and I answered them. The patient agreed to be admitted to the hospital.    Procedures   Procedures    Patient was seen independently    Kings Lema MD  Emergency Medicine     Kings Lema MD  11/18/24 4385

## 2024-11-13 PROBLEM — Z78.9 ALCOHOL USE: Status: ACTIVE | Noted: 2024-11-13

## 2024-11-13 PROBLEM — F10.221: Status: ACTIVE | Noted: 2024-11-13

## 2024-11-13 LAB
ALBUMIN SERPL BCP-MCNC: 3.8 G/DL (ref 3.4–5)
ANION GAP SERPL CALC-SCNC: 10 MMOL/L (ref 10–20)
BUN SERPL-MCNC: 14 MG/DL (ref 6–23)
CALCIUM SERPL-MCNC: 9 MG/DL (ref 8.6–10.3)
CHLORIDE SERPL-SCNC: 106 MMOL/L (ref 98–107)
CO2 SERPL-SCNC: 28 MMOL/L (ref 21–32)
CREAT SERPL-MCNC: 0.88 MG/DL (ref 0.5–1.3)
EGFRCR SERPLBLD CKD-EPI 2021: >90 ML/MIN/1.73M*2
ERYTHROCYTE [DISTWIDTH] IN BLOOD BY AUTOMATED COUNT: 13.2 % (ref 11.5–14.5)
ETHANOL SERPL-MCNC: 274 MG/DL
GLUCOSE SERPL-MCNC: 103 MG/DL (ref 74–99)
HCT VFR BLD AUTO: 41.7 % (ref 41–52)
HGB BLD-MCNC: 14.5 G/DL (ref 13.5–17.5)
MAGNESIUM SERPL-MCNC: 1.92 MG/DL (ref 1.6–2.4)
MCH RBC QN AUTO: 33 PG (ref 26–34)
MCHC RBC AUTO-ENTMCNC: 34.8 G/DL (ref 32–36)
MCV RBC AUTO: 95 FL (ref 80–100)
NRBC BLD-RTO: 0 /100 WBCS (ref 0–0)
PHOSPHATE SERPL-MCNC: 4 MG/DL (ref 2.5–4.9)
PLATELET # BLD AUTO: 237 X10*3/UL (ref 150–450)
POTASSIUM SERPL-SCNC: 4.3 MMOL/L (ref 3.5–5.3)
RBC # BLD AUTO: 4.4 X10*6/UL (ref 4.5–5.9)
SODIUM SERPL-SCNC: 140 MMOL/L (ref 136–145)
WBC # BLD AUTO: 6.6 X10*3/UL (ref 4.4–11.3)

## 2024-11-13 PROCEDURE — 80069 RENAL FUNCTION PANEL: CPT | Performed by: INTERNAL MEDICINE

## 2024-11-13 PROCEDURE — 2500000001 HC RX 250 WO HCPCS SELF ADMINISTERED DRUGS (ALT 637 FOR MEDICARE OP): Performed by: INTERNAL MEDICINE

## 2024-11-13 PROCEDURE — 2500000002 HC RX 250 W HCPCS SELF ADMINISTERED DRUGS (ALT 637 FOR MEDICARE OP, ALT 636 FOR OP/ED): Performed by: INTERNAL MEDICINE

## 2024-11-13 PROCEDURE — 85027 COMPLETE CBC AUTOMATED: CPT | Performed by: INTERNAL MEDICINE

## 2024-11-13 PROCEDURE — 36415 COLL VENOUS BLD VENIPUNCTURE: CPT | Performed by: INTERNAL MEDICINE

## 2024-11-13 PROCEDURE — 2500000001 HC RX 250 WO HCPCS SELF ADMINISTERED DRUGS (ALT 637 FOR MEDICARE OP): Performed by: EMERGENCY MEDICINE

## 2024-11-13 PROCEDURE — 83735 ASSAY OF MAGNESIUM: CPT | Performed by: INTERNAL MEDICINE

## 2024-11-13 PROCEDURE — 94640 AIRWAY INHALATION TREATMENT: CPT

## 2024-11-13 PROCEDURE — 1200000002 HC GENERAL ROOM WITH TELEMETRY DAILY

## 2024-11-13 PROCEDURE — 99232 SBSQ HOSP IP/OBS MODERATE 35: CPT | Performed by: INTERNAL MEDICINE

## 2024-11-13 PROCEDURE — 99223 1ST HOSP IP/OBS HIGH 75: CPT | Performed by: INTERNAL MEDICINE

## 2024-11-13 PROCEDURE — S4991 NICOTINE PATCH NONLEGEND: HCPCS | Performed by: INTERNAL MEDICINE

## 2024-11-13 RX ORDER — CARBIDOPA AND LEVODOPA 25; 100 MG/1; MG/1
2 TABLET ORAL 3 TIMES DAILY
Status: DISCONTINUED | OUTPATIENT
Start: 2024-11-13 | End: 2024-11-18 | Stop reason: HOSPADM

## 2024-11-13 RX ORDER — DIAZEPAM 5 MG/1
10 TABLET ORAL ONCE
Status: COMPLETED | OUTPATIENT
Start: 2024-11-13 | End: 2024-11-13

## 2024-11-13 RX ORDER — LORAZEPAM 2 MG/ML
2 INJECTION INTRAMUSCULAR ONCE AS NEEDED
Status: DISCONTINUED | OUTPATIENT
Start: 2024-11-13 | End: 2024-11-18 | Stop reason: HOSPADM

## 2024-11-13 RX ORDER — IBUPROFEN 200 MG
1 TABLET ORAL DAILY
Status: DISCONTINUED | OUTPATIENT
Start: 2024-11-13 | End: 2024-11-18 | Stop reason: HOSPADM

## 2024-11-13 RX ORDER — PHENOBARBITAL 32.4 MG/1
32.4 TABLET ORAL 3 TIMES DAILY
Status: DISCONTINUED | OUTPATIENT
Start: 2024-11-17 | End: 2024-11-16

## 2024-11-13 RX ORDER — ALBUTEROL SULFATE 90 UG/1
2 INHALANT RESPIRATORY (INHALATION) EVERY 2 HOUR PRN
Status: DISCONTINUED | OUTPATIENT
Start: 2024-11-13 | End: 2024-11-18 | Stop reason: HOSPADM

## 2024-11-13 RX ORDER — IPRATROPIUM BROMIDE AND ALBUTEROL SULFATE 2.5; .5 MG/3ML; MG/3ML
3 SOLUTION RESPIRATORY (INHALATION) ONCE
Status: DISCONTINUED | OUTPATIENT
Start: 2024-11-13 | End: 2024-11-18 | Stop reason: HOSPADM

## 2024-11-13 RX ORDER — PANTOPRAZOLE SODIUM 40 MG/1
40 TABLET, DELAYED RELEASE ORAL DAILY
Status: DISCONTINUED | OUTPATIENT
Start: 2024-11-13 | End: 2024-11-18 | Stop reason: HOSPADM

## 2024-11-13 RX ORDER — PHENOBARBITAL 32.4 MG/1
97.2 TABLET ORAL 3 TIMES DAILY
Status: COMPLETED | OUTPATIENT
Start: 2024-11-13 | End: 2024-11-14

## 2024-11-13 RX ORDER — ALBUTEROL SULFATE 90 UG/1
2 INHALANT RESPIRATORY (INHALATION) EVERY 4 HOURS PRN
Status: DISCONTINUED | OUTPATIENT
Start: 2024-11-13 | End: 2024-11-13

## 2024-11-13 RX ORDER — POLYETHYLENE GLYCOL 3350 17 G/17G
17 POWDER, FOR SOLUTION ORAL DAILY PRN
Status: DISCONTINUED | OUTPATIENT
Start: 2024-11-13 | End: 2024-11-18 | Stop reason: HOSPADM

## 2024-11-13 RX ORDER — IPRATROPIUM BROMIDE AND ALBUTEROL SULFATE 2.5; .5 MG/3ML; MG/3ML
3 SOLUTION RESPIRATORY (INHALATION) EVERY 4 HOURS PRN
Status: DISCONTINUED | OUTPATIENT
Start: 2024-11-13 | End: 2024-11-18 | Stop reason: HOSPADM

## 2024-11-13 RX ORDER — PHENOBARBITAL 32.4 MG/1
64.8 TABLET ORAL 3 TIMES DAILY
Status: DISCONTINUED | OUTPATIENT
Start: 2024-11-15 | End: 2024-11-16

## 2024-11-13 RX ORDER — LANOLIN ALCOHOL/MO/W.PET/CERES
500 CREAM (GRAM) TOPICAL 3 TIMES DAILY
Status: COMPLETED | OUTPATIENT
Start: 2024-11-13 | End: 2024-11-15

## 2024-11-13 SDOH — ECONOMIC STABILITY: FOOD INSECURITY: WITHIN THE PAST 12 MONTHS, YOU WORRIED THAT YOUR FOOD WOULD RUN OUT BEFORE YOU GOT THE MONEY TO BUY MORE.: NEVER TRUE

## 2024-11-13 SDOH — SOCIAL STABILITY: SOCIAL INSECURITY: HAVE YOU HAD THOUGHTS OF HARMING ANYONE ELSE?: NO

## 2024-11-13 SDOH — SOCIAL STABILITY: SOCIAL INSECURITY: DO YOU FEEL ANYONE HAS EXPLOITED OR TAKEN ADVANTAGE OF YOU FINANCIALLY OR OF YOUR PERSONAL PROPERTY?: NO

## 2024-11-13 SDOH — SOCIAL STABILITY: SOCIAL INSECURITY: WITHIN THE LAST YEAR, HAVE YOU BEEN HUMILIATED OR EMOTIONALLY ABUSED IN OTHER WAYS BY YOUR PARTNER OR EX-PARTNER?: NO

## 2024-11-13 SDOH — SOCIAL STABILITY: SOCIAL INSECURITY: ARE YOU OR HAVE YOU BEEN THREATENED OR ABUSED PHYSICALLY, EMOTIONALLY, OR SEXUALLY BY ANYONE?: NO

## 2024-11-13 SDOH — ECONOMIC STABILITY: INCOME INSECURITY: IN THE PAST 12 MONTHS HAS THE ELECTRIC, GAS, OIL, OR WATER COMPANY THREATENED TO SHUT OFF SERVICES IN YOUR HOME?: NO

## 2024-11-13 SDOH — ECONOMIC STABILITY: FOOD INSECURITY: WITHIN THE PAST 12 MONTHS, THE FOOD YOU BOUGHT JUST DIDN'T LAST AND YOU DIDN'T HAVE MONEY TO GET MORE.: NEVER TRUE

## 2024-11-13 SDOH — SOCIAL STABILITY: SOCIAL INSECURITY
WITHIN THE LAST YEAR, HAVE YOU BEEN RAPED OR FORCED TO HAVE ANY KIND OF SEXUAL ACTIVITY BY YOUR PARTNER OR EX-PARTNER?: NO

## 2024-11-13 SDOH — SOCIAL STABILITY: SOCIAL INSECURITY
WITHIN THE LAST YEAR, HAVE YOU BEEN KICKED, HIT, SLAPPED, OR OTHERWISE PHYSICALLY HURT BY YOUR PARTNER OR EX-PARTNER?: NO

## 2024-11-13 SDOH — SOCIAL STABILITY: SOCIAL INSECURITY: DO YOU FEEL UNSAFE GOING BACK TO THE PLACE WHERE YOU ARE LIVING?: NO

## 2024-11-13 SDOH — SOCIAL STABILITY: SOCIAL INSECURITY: DOES ANYONE TRY TO KEEP YOU FROM HAVING/CONTACTING OTHER FRIENDS OR DOING THINGS OUTSIDE YOUR HOME?: NO

## 2024-11-13 SDOH — SOCIAL STABILITY: SOCIAL INSECURITY: WITHIN THE LAST YEAR, HAVE YOU BEEN AFRAID OF YOUR PARTNER OR EX-PARTNER?: NO

## 2024-11-13 SDOH — SOCIAL STABILITY: SOCIAL INSECURITY: ARE THERE ANY APPARENT SIGNS OF INJURIES/BEHAVIORS THAT COULD BE RELATED TO ABUSE/NEGLECT?: NO

## 2024-11-13 SDOH — SOCIAL STABILITY: SOCIAL INSECURITY: HAVE YOU HAD ANY THOUGHTS OF HARMING ANYONE ELSE?: NO

## 2024-11-13 SDOH — SOCIAL STABILITY: SOCIAL INSECURITY: WERE YOU ABLE TO COMPLETE ALL THE BEHAVIORAL HEALTH SCREENINGS?: YES

## 2024-11-13 SDOH — SOCIAL STABILITY: SOCIAL INSECURITY: HAS ANYONE EVER THREATENED TO HURT YOUR FAMILY OR YOUR PETS?: NO

## 2024-11-13 SDOH — SOCIAL STABILITY: SOCIAL INSECURITY: ABUSE: ADULT

## 2024-11-13 ASSESSMENT — ENCOUNTER SYMPTOMS
RESPIRATORY NEGATIVE: 1
EYES NEGATIVE: 1
WEAKNESS: 1
CHILLS: 1
GASTROINTESTINAL NEGATIVE: 1
CARDIOVASCULAR NEGATIVE: 1
ENDOCRINE NEGATIVE: 1
ALLERGIC/IMMUNOLOGIC NEGATIVE: 1
PSYCHIATRIC NEGATIVE: 1

## 2024-11-13 ASSESSMENT — LIFESTYLE VARIABLES
AUDITORY DISTURBANCES: NOT PRESENT
PAROXYSMAL SWEATS: NO SWEAT VISIBLE
TREMOR: MODERATE, WITH PATIENT'S ARMS EXTENDED
PULSE: 97
PAROXYSMAL SWEATS: NO SWEAT VISIBLE
ANXIETY: MODERATELY ANXIOUS, OR GUARDED, SO ANXIETY IS INFERRED
ANXIETY: MILDLY ANXIOUS
HOW OFTEN DO YOU HAVE A DRINK CONTAINING ALCOHOL: 4 OR MORE TIMES A WEEK
ANXIETY: 3
NAUSEA AND VOMITING: NO NAUSEA AND NO VOMITING
TREMOR: 3
AUDITORY DISTURBANCES: NOT PRESENT
AGITATION: NORMAL ACTIVITY
AUDITORY DISTURBANCES: NOT PRESENT
AUDITORY DISTURBANCES: NOT PRESENT
PAROXYSMAL SWEATS: NO SWEAT VISIBLE
TACTILE DISTURBANCES: VERY MILD ITCHING, PINS AND NEEDLES, BURNING OR NUMBNESS
TREMOR: MODERATE, WITH PATIENT'S ARMS EXTENDED
NAUSEA AND VOMITING: NO NAUSEA AND NO VOMITING
ORIENTATION AND CLOUDING OF SENSORIUM: ORIENTED AND CAN DO SERIAL ADDITIONS
AUDIT-C TOTAL SCORE: 12
ORIENTATION AND CLOUDING OF SENSORIUM: ORIENTED AND CAN DO SERIAL ADDITIONS
PAROXYSMAL SWEATS: BEADS OF SWEAT OBVIOUS ON FOREHEAD
AGITATION: 2
HEADACHE, FULLNESS IN HEAD: MILD
HOW MANY STANDARD DRINKS CONTAINING ALCOHOL DO YOU HAVE ON A TYPICAL DAY: 10 OR MORE
TOTAL SCORE: 0
HEADACHE, FULLNESS IN HEAD: MILD
PAROXYSMAL SWEATS: NO SWEAT VISIBLE
AGITATION: NORMAL ACTIVITY
TOTAL SCORE: 4
ORIENTATION AND CLOUDING OF SENSORIUM: ORIENTED AND CAN DO SERIAL ADDITIONS
VISUAL DISTURBANCES: NOT PRESENT
HEADACHE, FULLNESS IN HEAD: NOT PRESENT
TREMOR: 5
HEADACHE, FULLNESS IN HEAD: VERY MILD
NAUSEA AND VOMITING: NO NAUSEA AND NO VOMITING
TREMOR: 2
NAUSEA AND VOMITING: NO NAUSEA AND NO VOMITING
AGITATION: 5
ORIENTATION AND CLOUDING OF SENSORIUM: ORIENTED AND CAN DO SERIAL ADDITIONS
AGITATION: NORMAL ACTIVITY
AUDIT-C TOTAL SCORE: 12
VISUAL DISTURBANCES: NOT PRESENT
AUDITORY DISTURBANCES: NOT PRESENT
HOW OFTEN DO YOU HAVE 6 OR MORE DRINKS ON ONE OCCASION: DAILY OR ALMOST DAILY
ANXIETY: NO ANXIETY, AT EASE
SKIP TO QUESTIONS 9-10: 0
NAUSEA AND VOMITING: NO NAUSEA AND NO VOMITING
ANXIETY: MODERATELY ANXIOUS, OR GUARDED, SO ANXIETY IS INFERRED
VISUAL DISTURBANCES: NOT PRESENT
ORIENTATION AND CLOUDING OF SENSORIUM: ORIENTED AND CAN DO SERIAL ADDITIONS
HEADACHE, FULLNESS IN HEAD: NOT PRESENT
TOTAL SCORE: 14
NAUSEA AND VOMITING: NO NAUSEA AND NO VOMITING
ANXIETY: 5
AUDITORY DISTURBANCES: NOT PRESENT
PAROXYSMAL SWEATS: NO SWEAT VISIBLE
VISUAL DISTURBANCES: NOT PRESENT
TOTAL SCORE: 21
AGITATION: MODERATELY FIDGETY AND RESTLESS
TOTAL SCORE: 7
VISUAL DISTURBANCES: NOT PRESENT
ORIENTATION AND CLOUDING OF SENSORIUM: ORIENTED AND CAN DO SERIAL ADDITIONS
TOTAL SCORE: 11
VISUAL DISTURBANCES: NOT PRESENT
TREMOR: 2
TREMOR: MODERATE, WITH PATIENT'S ARMS EXTENDED
VISUAL DISTURBANCES: NOT PRESENT
ORIENTATION AND CLOUDING OF SENSORIUM: ORIENTED AND CAN DO SERIAL ADDITIONS
AGITATION: NORMAL ACTIVITY
HEADACHE, FULLNESS IN HEAD: NOT PRESENT
PAROXYSMAL SWEATS: NO SWEAT VISIBLE
PAROXYSMAL SWEATS: NO SWEAT VISIBLE
AGITATION: NORMAL ACTIVITY
AUDITORY DISTURBANCES: NOT PRESENT
TREMOR: NO TREMOR
AUDITORY DISTURBANCES: NOT PRESENT
TOTAL SCORE: 4
ORIENTATION AND CLOUDING OF SENSORIUM: ORIENTED AND CAN DO SERIAL ADDITIONS
NAUSEA AND VOMITING: NO NAUSEA AND NO VOMITING
HEADACHE, FULLNESS IN HEAD: MILD
HEADACHE, FULLNESS IN HEAD: NOT PRESENT
VISUAL DISTURBANCES: NOT PRESENT
TOTAL SCORE: 5
NAUSEA AND VOMITING: NO NAUSEA AND NO VOMITING
ANXIETY: NO ANXIETY, AT EASE
ANXIETY: 2

## 2024-11-13 ASSESSMENT — PAIN SCALES - GENERAL
PAINLEVEL_OUTOF10: 0 - NO PAIN
PAINLEVEL_OUTOF10: 5 - MODERATE PAIN
PAINLEVEL_OUTOF10: 4
PAINLEVEL_OUTOF10: 3

## 2024-11-13 ASSESSMENT — PAIN - FUNCTIONAL ASSESSMENT
PAIN_FUNCTIONAL_ASSESSMENT: 0-10

## 2024-11-13 ASSESSMENT — COGNITIVE AND FUNCTIONAL STATUS - GENERAL
MOBILITY SCORE: 24
DAILY ACTIVITIY SCORE: 24
PATIENT BASELINE BEDBOUND: NO
DAILY ACTIVITIY SCORE: 24
MOBILITY SCORE: 24
DAILY ACTIVITIY SCORE: 24
MOBILITY SCORE: 24

## 2024-11-13 ASSESSMENT — ACTIVITIES OF DAILY LIVING (ADL)
JUDGMENT_ADEQUATE_SAFELY_COMPLETE_DAILY_ACTIVITIES: YES
LACK_OF_TRANSPORTATION: NO
HEARING - RIGHT EAR: FUNCTIONAL
TOILETING: INDEPENDENT
GROOMING: INDEPENDENT
ADEQUATE_TO_COMPLETE_ADL: YES
DRESSING YOURSELF: INDEPENDENT
LACK_OF_TRANSPORTATION: NO
PATIENT'S MEMORY ADEQUATE TO SAFELY COMPLETE DAILY ACTIVITIES?: YES
FEEDING YOURSELF: INDEPENDENT
BATHING: INDEPENDENT
HEARING - LEFT EAR: FUNCTIONAL
WALKS IN HOME: INDEPENDENT
LACK_OF_TRANSPORTATION: NO

## 2024-11-13 ASSESSMENT — PATIENT HEALTH QUESTIONNAIRE - PHQ9
2. FEELING DOWN, DEPRESSED OR HOPELESS: NOT AT ALL
SUM OF ALL RESPONSES TO PHQ9 QUESTIONS 1 & 2: 0
1. LITTLE INTEREST OR PLEASURE IN DOING THINGS: NOT AT ALL

## 2024-11-13 NOTE — NURSING NOTE
Dr. Salinas and dr. Martínez negrete were both informed that patient had a seizure.  Lasted 2 minutes.   Did not bite his tongue.  Was alert right after the seizure.   Patient stated that when he goes through etoh withdrawal he has seizures.    Dr. Salinas started him on seizure meds today.   He had rec'd two doses.
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
## 2024-11-13 NOTE — NURSING NOTE
Ciwa 21    phenobarb ordered per dr. Salinas with instructions to give a now dose.   Patient instructed to call for assist to restroom. Not to get up without help.    Will continue to monitor
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
## 2024-11-13 NOTE — H&P
Chief Complaint   Patient presents with    intox       HPI    Arjun Ocampo is a 59 y.o. male with a PMHx of bronchitis/asthma?,CHF, parkinsonism ,nicotine dependence and alcohol abuse who presented to [facility] on 11/12/2024 with a chief complaint of falls secondary to alcohol intoxication.  According to the ED note, the patient had heavy drinking yesterday after found that his significant other has been unfaithful, but denied this during my interview.  He stated he drank 40 beers which is normal for him. He reported multiple years of sobriety, but he had a recent admission for alcohol use.  He stated that he wants to be safe and detoxicated so that he can go for rehab. He denies any headaches, change in vision, difficulty swallowing, change in hearing, fever/chills, chest pain, SOB, palpitation, cough, abdominal pain, weight change, change in bowel habits/urine, joint pain/swelling, weakness in any of the extremities or swelling, insomnia or any symptoms of depression. He strongly denied hurting himself or others.   ROS: 10 point review of systems negative with the exception of above.    ED Course:    ED Triage Vitals [11/12/24 1716]   Temperature Heart Rate Respirations BP   37 °C (98.6 °F) 87 18 164/79      Pulse Ox Temp Source Heart Rate Source Patient Position   (!) 92 % Temporal -- --      BP Location FiO2 (%)     -- --         Labs:  Abnormal Labs Reviewed   ALCOHOL - Abnormal; Notable for the following components:       Result Value    Alcohol 274 (*)     All other components within normal limits        No orders to display       CT abdomen pelvis w IV contrast   Final Result   No ascites, free air or bowel obstruction.    No urinary tract calculus or hydronephrosis.   5 cm diverticulum at the junction of the second and third portions of   the duodenum.  No associated inflammation.   Fatty liver.   Signed by Alphonso Curry MD      XR elbow right 3+ views   Final Result   Impression:   Open reduction  internal fixation of the RIGHT olecranon.  Nondisplaced   fracture of the olecranon the age of which is uncertain.  No priors   are available for comparison.  This may represent the fracture   necessitating the ORIF.   No fracture of the humerus or visualized portions of the radius.    Degenerative changes.  No discernible joint effusion.  No findings of   hardware failure.   Signed by Marcello Cook MD      Lower extremity venous duplex left    (Results Pending)     CT abdomen pelvis w IV contrast   Final Result   No ascites, free air or bowel obstruction.    No urinary tract calculus or hydronephrosis.   5 cm diverticulum at the junction of the second and third portions of   the duodenum.  No associated inflammation.   Fatty liver.   Signed by Alphonso Curry MD      XR elbow right 3+ views   Final Result   Impression:   Open reduction internal fixation of the RIGHT olecranon.  Nondisplaced   fracture of the olecranon the age of which is uncertain.  No priors   are available for comparison.  This may represent the fracture   necessitating the ORIF.   No fracture of the humerus or visualized portions of the radius.    Degenerative changes.  No discernible joint effusion.  No findings of   hardware failure.   Signed by Marcello Cook MD              Intervention: In ED, patient received   Medications   folic acid (Folvite) tablet 1 mg (has no administration in time range)   multivitamin with minerals 1 tablet (has no administration in time range)   thiamine (Vitamin B-1) tablet 100 mg (has no administration in time range)   diazePAM (Valium) tablet 10 mg (has no administration in time range)   polyethylene glycol (Glycolax, Miralax) packet 17 g (has no administration in time range)   famotidine PF (Pepcid) injection 20 mg (20 mg intravenous Given 11/12/24 1759)   iohexol (OMNIPaque) 350 mg iodine/mL solution 75 mL (75 mL intravenous Given 11/12/24 1847)   ondansetron (Zofran) tablet 4 mg (4 mg oral Given 11/12/24  "2125)   hydrOXYzine HCL (Atarax) tablet 50 mg (50 mg oral Given 11/12/24 2125)   ibuprofen tablet 600 mg (600 mg oral Given 11/12/24 2125)      Patient was then transferred to the floor for further management      Meds:   Modified Medications    No medications on file       Follows up with Dr. Reid Dominguez MD      Past Medical History     Past Medical History:   Diagnosis Date    Asthma     CHF (congestive heart failure)     COPD (chronic obstructive pulmonary disease) (Multi)     Parkinson disease (Multi)     Prostate CA (Multi)       Surgical History   No past surgical history on file.  Family History   No family history on file.  Social History     Tobacco Use: High Risk (10/22/2024)    Patient History     Smoking Tobacco Use: Every Day     Smokeless Tobacco Use: Never     Passive Exposure: Current      Social History     Substance and Sexual Activity   Alcohol Use Not Currently    Alcohol/week: 960.0 standard drinks of alcohol    Types: 960 Cans of beer per week    Comment: 24 40oz cans of beer a day      Allergies     Allergies   Allergen Reactions    Chocolate Anaphylaxis    Cocoa Anaphylaxis    Penicillins Anaphylaxis    Risperidone Hives, Shortness of breath and Swelling      Meds    Scheduled medications  folic acid, 1 mg, oral, Daily  multivitamin with minerals, 1 tablet, oral, Daily  thiamine, 100 mg, oral, Daily      Continuous medications     PRN medications  PRN medications: diazePAM, polyethylene glycol   Objective     Vitals  Visit Vitals  /70   Pulse (!) 108   Temp 37 °C (98.6 °F) (Temporal)   Resp 16   Ht 1.651 m (5' 5\")   Wt 65.8 kg (145 lb)   SpO2 94%   BMI 24.13 kg/m²   Smoking Status Every Day   BSA 1.74 m²        Review of Systems   Constitutional:  Positive for chills.   HENT: Negative.     Eyes: Negative.    Respiratory: Negative.     Cardiovascular: Negative.    Gastrointestinal: Negative.    Endocrine: Negative.    Genitourinary: Negative.    Musculoskeletal:  Positive " for gait problem.   Skin: Negative.    Allergic/Immunologic: Negative.    Neurological:  Positive for weakness.   Psychiatric/Behavioral: Negative.       Temperature:  [37 °C (98.6 °F)] 37 °C (98.6 °F)  Heart Rate:  [] 108  Respirations:  [16-18] 16  BP: (131-164)/(70-80) 131/70  No intake/output data recorded.  No intake/output data recorded.  Physical Exam  Constitutional:       Appearance: He is normal weight. He is ill-appearing.   HENT:      Head: Normocephalic and atraumatic.      Nose: Nose normal.   Eyes:      Extraocular Movements: Extraocular movements intact.      Conjunctiva/sclera: Conjunctivae normal.      Pupils: Pupils are equal, round, and reactive to light.   Cardiovascular:      Rate and Rhythm: Normal rate and regular rhythm.      Pulses: Normal pulses.      Heart sounds: Normal heart sounds.   Pulmonary:      Effort: Pulmonary effort is normal.      Breath sounds: Normal breath sounds.   Abdominal:      General: Abdomen is flat. Bowel sounds are normal.      Palpations: Abdomen is soft.   Musculoskeletal:         General: Normal range of motion.      Cervical back: Normal range of motion and neck supple.   Skin:     General: Skin is warm and dry.   Neurological:      General: No focal deficit present.      Mental Status: He is alert and oriented to person, place, and time. Mental status is at baseline.   Psychiatric:      Comments: Looks anxious       Principal Problem:    Alcohol intoxication delirium, with moderate use disorder (Multi)          I/Os  No intake or output data in the 24 hours ending 11/13/24 0012      Labs:   Results from last 72 hours   Lab Units 11/12/24  1740   SODIUM mmol/L 139   POTASSIUM mmol/L 3.8   CHLORIDE mmol/L 102   CO2 mmol/L 28   BUN mg/dL 6   CREATININE mg/dL 0.73   GLUCOSE mg/dL 94   CALCIUM mg/dL 9.2   ANION GAP mmol/L 13   EGFR mL/min/1.73m*2 >90      Results from last 72 hours   Lab Units 11/12/24  1740   WBC AUTO x10*3/uL 9.9   HEMOGLOBIN g/dL 14.8  "  HEMATOCRIT % 43.4   PLATELETS AUTO x10*3/uL 274   NEUTROS PCT AUTO % 71.6   LYMPHS PCT AUTO % 15.4   MONOS PCT AUTO % 8.4   EOS PCT AUTO % 3.7      Lab Results   Component Value Date    CALCIUM 9.2 11/12/2024    PHOS 3.7 10/20/2024      No results found for: \"CRP\"   [unfilled]     Micro/ID:   No results found for the last 90 days.                   No lab exists for component: \"AGALPCRNB\"   .ID  No results found for: \"URINECULTURE\", \"BLOODCULT\", \"CSFCULTSMEAR\"  Images    CT abdomen pelvis w IV contrast  Narrative: STUDY:  CT Abdomen and Pelvis with IV Contrast; 11/12/202418:48  INDICATION:  Abdominal pain.  COMPARISON:  10/20/2024 CT Abdomen and Pelvis.  ACCESSION NUMBER(S):  [id number]  ORDERING CLINICIAN:  MARISSA HIDALGO  TECHNIQUE:  CT of the abdomen and pelvis was performed.  Contiguous axial images  were obtained at 3 mm slice thickness through the abdomen and pelvis.   Coronal and sagittal reconstructions at 3 mm slice thickness were  performed.  Omnipaque 350 75 mL was administered intravenously.    FINDINGS:  LOWER CHEST:  No cardiomegaly.  Coronary artery calcification noted.  No pericardial  effusion.  Nonspecific mild nodularity at the left lung base is stable  from the prior study and may represent scarring.     ABDOMEN:     LIVER:  No hepatomegaly.  Smooth surface contour.  The attenuation pattern of  the liver suggests fatty infiltration.     BILE DUCTS:  No intrahepatic or extrahepatic biliary ductal dilatation.     GALLBLADDER:  The gallbladder appears unremarkable.  STOMACH:  No abnormalities identified.     PANCREAS:  No masses or ductal dilatation.     SPLEEN:  No splenomegaly or focal splenic lesion.     ADRENAL GLANDS:  No thickening or nodules.     KIDNEYS AND URETERS:  Low-attenuation cortical lesion of the left kidney is consistent with  a cyst.  No renal or ureteral calculi.     PELVIS:     BLADDER:  No abnormalities identified.     REPRODUCTIVE ORGANS:  No abnormalities " identified.     BOWEL:  There is a 5 cm diverticulum at the junction of the second and third  portions of the duodenum.     VESSELS:  No abnormalities identified.  Abdominal aorta is normal in caliber.      PERITONEUM/RETROPERITONEUM/LYMPH NODES:  No free fluid.  No pneumoperitoneum.  No lymphadenopathy.     ABDOMINAL WALL:  No abnormalities identified.  SOFT TISSUES:   No abnormalities identified.     BONES:  No acute fracture or aggressive osseous lesion. There are advanced  degenerative changes of the spine.  There is streak artifact through  the pelvis from bilateral hip replacements.  Impression: No ascites, free air or bowel obstruction.   No urinary tract calculus or hydronephrosis.  5 cm diverticulum at the junction of the second and third portions of  the duodenum.  No associated inflammation.  Fatty liver.  Signed by Alphonso Curry MD  XR elbow right 3+ views  Narrative: STUDY:  Elbow Radiographs; 11/12/2024 6:05 PM  INDICATION:  Fall.  COMPARISON:  None Available.  ACCESSION NUMBER(S):  [id number]  ORDERING CLINICIAN:  MARISSA HIDALGO  TECHNIQUE:  Four view(s) of the right elbow.  FINDINGS:    Open reduction internal fixation of the RIGHT olecranon.  Nondisplaced  fracture of the olecranon.  No fracture of the humerus or visualized  portions of the radius.  Degenerative changes.  No discernible joint  effusion.  No findings of hardware failure.  Impression: Impression:  Open reduction internal fixation of the RIGHT olecranon.  Nondisplaced  fracture of the olecranon the age of which is uncertain.  No priors  are available for comparison.  This may represent the fracture  necessitating the ORIF.  No fracture of the humerus or visualized portions of the radius.   Degenerative changes.  No discernible joint effusion.  No findings of  hardware failure.  Signed by Marcello Cook MD    Assessment and Plan    Arjun Ocampo is a 59 y.o. male admitted for fall secondary to alcohol intoxication.    Acute  Medical Issues   # Fall secondary to alcohol intoxication:  # Alcohol abuse disorder:  -Patient had a fall on his own, x-ray was negative.  -Started on CIWA protocol, with high thiamine level.  -Continue to monitor for alcohol withdrawal.  -PT/OT for evaluation, patient expressed wishes to be admitted to rehab.    #Tobacco use disorder:  -Patient smokes half pack per day since he was 8.  -Counseled about quitting smoking and hazards of continuing the same behavior.  -Will offer nicotine patch.      # Cannabinoid use disorder:  -Urine toxicology is positive for cannabinoid.  - the patient about quitting cannabinoid.      Chronic Medical Issues   # Parkinsonism:  -Continue Sinemet  mg 3 times daily      # GERD:  -Continue pantoprazole 40 mg daily.      # COPD:  -Continue Spiriva Respimat 2.5 mcg inhaler, DuoNebs as needed.    F: PO intake & IVF PRN  E: Replete as needed  N: Regular diet  GI ppx: Protonix 40 mg daily   DVT ppx: Lovenox subcutaneous  Antibiotics: None  Oxygenation: RA    Code Status: Full Code   Emergency Contact: No emergency contact information on file.     Disposition: 59 y.o.male admitted for fall secondary to alcohol intoxication, anticipate LOS > 2 midnights.         Phyllis Kirby  Internal Medicine, Hospitalist   [facility]  [place]

## 2024-11-13 NOTE — NURSING NOTE
Per ciwa score = 14, valium 10 mg po administered.    in to see pt. Possible discharge to facility. Coleman,  Patient knows he needs help with his alcoholism.

## 2024-11-13 NOTE — CARE PLAN
The patient's goals for the shift include  CIWA evanulation q 2 hours    The clinical goals for the shift include Pt will remain safe and free from falls and injury

## 2024-11-13 NOTE — PROGRESS NOTES
Arjun Ocampo is a 59 y.o. male on day 0 of admission presenting with Alcohol intoxication delirium, with moderate use disorder (Multi).      Subjective   Complains of diaphoresis and anxiety this am.       Objective     Last Recorded Vitals  /81 (BP Location: Left arm, Patient Position: Lying)   Pulse 55   Temp 36.3 °C (97.3 °F)   Resp 16   Wt 65.8 kg (145 lb)   SpO2 94%   Intake/Output last 3 Shifts:  No intake or output data in the 24 hours ending 11/13/24 1251    Admission Weight  Weight: 65.8 kg (145 lb) (11/12/24 1716)    Daily Weight  11/13/24 : 65.8 kg (145 lb)    Image Results  ECG 12 lead  Sinus rhythm  Minimal ST elevation, inferior leads      Physical Exam  Vitals reviewed.   Constitutional:       Appearance: Normal appearance.   HENT:      Head: Normocephalic and atraumatic.      Mouth/Throat:      Mouth: Mucous membranes are moist.   Eyes:      Conjunctiva/sclera: Conjunctivae normal.   Cardiovascular:      Rate and Rhythm: Normal rate.      Pulses: Normal pulses.   Pulmonary:      Effort: Pulmonary effort is normal.      Breath sounds: Normal breath sounds. No wheezing.   Abdominal:      General: Abdomen is flat. There is no distension.      Palpations: Abdomen is soft.      Tenderness: There is no guarding.   Musculoskeletal:         General: No swelling. Normal range of motion.   Skin:     General: Skin is warm and dry.   Neurological:      General: No focal deficit present.      Mental Status: He is alert. Mental status is at baseline.      Comments: Elevated ciwa   Psychiatric:         Mood and Affect: Mood normal.         Behavior: Behavior normal.         Relevant Results               Assessment/Plan                  Assessment & Plan  Alcohol intoxication delirium, with moderate use disorder (Multi)    Alcohol abuse disorder severe  Acute alcohol withdrawal  Asthma  Parkinsonism  Tobacco abuse      Patient appeared anxious, tachycardic, sweaty.  Will add scheduled phenobarb  taper to the valium ciwa protocol.  Discussed with SW, and hopeful for inpatient placement after completing detox.  Continue home meds  Monitor respiratory status.  Ambulate for DVT prophy  Reg diet  Full code    Abilio Salinas MD

## 2024-11-14 LAB
ALBUMIN SERPL BCP-MCNC: 3.7 G/DL (ref 3.4–5)
ANION GAP SERPL CALC-SCNC: 10 MMOL/L (ref 10–20)
BUN SERPL-MCNC: 14 MG/DL (ref 6–23)
CALCIUM SERPL-MCNC: 9 MG/DL (ref 8.6–10.3)
CHLORIDE SERPL-SCNC: 102 MMOL/L (ref 98–107)
CO2 SERPL-SCNC: 29 MMOL/L (ref 21–32)
CREAT SERPL-MCNC: 0.78 MG/DL (ref 0.5–1.3)
EGFRCR SERPLBLD CKD-EPI 2021: >90 ML/MIN/1.73M*2
ERYTHROCYTE [DISTWIDTH] IN BLOOD BY AUTOMATED COUNT: 13.2 % (ref 11.5–14.5)
GLUCOSE SERPL-MCNC: 97 MG/DL (ref 74–99)
HCT VFR BLD AUTO: 46.2 % (ref 41–52)
HGB BLD-MCNC: 15.6 G/DL (ref 13.5–17.5)
MAGNESIUM SERPL-MCNC: 1.7 MG/DL (ref 1.6–2.4)
MCH RBC QN AUTO: 32.6 PG (ref 26–34)
MCHC RBC AUTO-ENTMCNC: 33.8 G/DL (ref 32–36)
MCV RBC AUTO: 97 FL (ref 80–100)
NRBC BLD-RTO: 0 /100 WBCS (ref 0–0)
PHOSPHATE SERPL-MCNC: 3.3 MG/DL (ref 2.5–4.9)
PLATELET # BLD AUTO: 219 X10*3/UL (ref 150–450)
POTASSIUM SERPL-SCNC: 4.2 MMOL/L (ref 3.5–5.3)
RBC # BLD AUTO: 4.78 X10*6/UL (ref 4.5–5.9)
SODIUM SERPL-SCNC: 137 MMOL/L (ref 136–145)
WBC # BLD AUTO: 8 X10*3/UL (ref 4.4–11.3)

## 2024-11-14 PROCEDURE — S4991 NICOTINE PATCH NONLEGEND: HCPCS | Performed by: INTERNAL MEDICINE

## 2024-11-14 PROCEDURE — 2500000001 HC RX 250 WO HCPCS SELF ADMINISTERED DRUGS (ALT 637 FOR MEDICARE OP): Performed by: EMERGENCY MEDICINE

## 2024-11-14 PROCEDURE — 2500000001 HC RX 250 WO HCPCS SELF ADMINISTERED DRUGS (ALT 637 FOR MEDICARE OP): Performed by: INTERNAL MEDICINE

## 2024-11-14 PROCEDURE — 1200000002 HC GENERAL ROOM WITH TELEMETRY DAILY

## 2024-11-14 PROCEDURE — 80069 RENAL FUNCTION PANEL: CPT | Performed by: INTERNAL MEDICINE

## 2024-11-14 PROCEDURE — 83735 ASSAY OF MAGNESIUM: CPT | Performed by: INTERNAL MEDICINE

## 2024-11-14 PROCEDURE — 99233 SBSQ HOSP IP/OBS HIGH 50: CPT | Performed by: INTERNAL MEDICINE

## 2024-11-14 PROCEDURE — 85027 COMPLETE CBC AUTOMATED: CPT | Performed by: INTERNAL MEDICINE

## 2024-11-14 PROCEDURE — 97165 OT EVAL LOW COMPLEX 30 MIN: CPT | Mod: GO

## 2024-11-14 PROCEDURE — 97162 PT EVAL MOD COMPLEX 30 MIN: CPT | Mod: GP

## 2024-11-14 PROCEDURE — 94640 AIRWAY INHALATION TREATMENT: CPT

## 2024-11-14 PROCEDURE — 2500000002 HC RX 250 W HCPCS SELF ADMINISTERED DRUGS (ALT 637 FOR MEDICARE OP, ALT 636 FOR OP/ED): Performed by: INTERNAL MEDICINE

## 2024-11-14 PROCEDURE — 36415 COLL VENOUS BLD VENIPUNCTURE: CPT | Performed by: INTERNAL MEDICINE

## 2024-11-14 ASSESSMENT — LIFESTYLE VARIABLES
PAROXYSMAL SWEATS: NO SWEAT VISIBLE
ANXIETY: 5
AUDITORY DISTURBANCES: NOT PRESENT
HEADACHE, FULLNESS IN HEAD: NOT PRESENT
PAROXYSMAL SWEATS: BARELY PERCEPTIBLE SWEATING, PALMS MOIST
VISUAL DISTURBANCES: NOT PRESENT
ORIENTATION AND CLOUDING OF SENSORIUM: ORIENTED AND CAN DO SERIAL ADDITIONS
TREMOR: 2
NAUSEA AND VOMITING: NO NAUSEA AND NO VOMITING
PAROXYSMAL SWEATS: BEADS OF SWEAT OBVIOUS ON FOREHEAD
AUDITORY DISTURBANCES: NOT PRESENT
TREMOR: 2
ORIENTATION AND CLOUDING OF SENSORIUM: ORIENTED AND CAN DO SERIAL ADDITIONS
AUDITORY DISTURBANCES: NOT PRESENT
ORIENTATION AND CLOUDING OF SENSORIUM: ORIENTED AND CAN DO SERIAL ADDITIONS
PULSE: 95
ANXIETY: NO ANXIETY, AT EASE
PAROXYSMAL SWEATS: NO SWEAT VISIBLE
AGITATION: NORMAL ACTIVITY
ORIENTATION AND CLOUDING OF SENSORIUM: ORIENTED AND CAN DO SERIAL ADDITIONS
AUDITORY DISTURBANCES: NOT PRESENT
ANXIETY: NO ANXIETY, AT EASE
VISUAL DISTURBANCES: NOT PRESENT
VISUAL DISTURBANCES: NOT PRESENT
AGITATION: NORMAL ACTIVITY
HEADACHE, FULLNESS IN HEAD: NOT PRESENT
AUDITORY DISTURBANCES: NOT PRESENT
TREMOR: 2
ORIENTATION AND CLOUDING OF SENSORIUM: ORIENTED AND CAN DO SERIAL ADDITIONS
TREMOR: MODERATE, WITH PATIENT'S ARMS EXTENDED
ANXIETY: NO ANXIETY, AT EASE
ANXIETY: NO ANXIETY, AT EASE
ORIENTATION AND CLOUDING OF SENSORIUM: ORIENTED AND CAN DO SERIAL ADDITIONS
ORIENTATION AND CLOUDING OF SENSORIUM: ORIENTED AND CAN DO SERIAL ADDITIONS
TOTAL SCORE: 2
AUDITORY DISTURBANCES: NOT PRESENT
HEADACHE, FULLNESS IN HEAD: NOT PRESENT
NAUSEA AND VOMITING: NO NAUSEA AND NO VOMITING
ORIENTATION AND CLOUDING OF SENSORIUM: ORIENTED AND CAN DO SERIAL ADDITIONS
TREMOR: 3
TOTAL SCORE: 2
VISUAL DISTURBANCES: NOT PRESENT
AGITATION: NORMAL ACTIVITY
TREMOR: 2
PAROXYSMAL SWEATS: NO SWEAT VISIBLE
NAUSEA AND VOMITING: NO NAUSEA AND NO VOMITING
AUDITORY DISTURBANCES: NOT PRESENT
TOTAL SCORE: 16
TREMOR: 2
PAROXYSMAL SWEATS: NO SWEAT VISIBLE
AGITATION: NORMAL ACTIVITY
AGITATION: SOMEWHAT MORE THAN NORMAL ACTIVITY
PAROXYSMAL SWEATS: NO SWEAT VISIBLE
AGITATION: NORMAL ACTIVITY
AUDITORY DISTURBANCES: NOT PRESENT
VISUAL DISTURBANCES: NOT PRESENT
TOTAL SCORE: 2
PAROXYSMAL SWEATS: NO SWEAT VISIBLE
AGITATION: NORMAL ACTIVITY
ORIENTATION AND CLOUDING OF SENSORIUM: ORIENTED AND CAN DO SERIAL ADDITIONS
AGITATION: NORMAL ACTIVITY
TOTAL SCORE: 2
PAROXYSMAL SWEATS: 3
AUDITORY DISTURBANCES: NOT PRESENT
HEADACHE, FULLNESS IN HEAD: NOT PRESENT
TOTAL SCORE: 2
NAUSEA AND VOMITING: NO NAUSEA AND NO VOMITING
ANXIETY: NO ANXIETY, AT EASE
TREMOR: 2
ORIENTATION AND CLOUDING OF SENSORIUM: ORIENTED AND CAN DO SERIAL ADDITIONS
VISUAL DISTURBANCES: NOT PRESENT
HEADACHE, FULLNESS IN HEAD: NOT PRESENT
NAUSEA AND VOMITING: NO NAUSEA AND NO VOMITING
TREMOR: 2
TOTAL SCORE: 4
VISUAL DISTURBANCES: NOT PRESENT
NAUSEA AND VOMITING: NO NAUSEA AND NO VOMITING
HEADACHE, FULLNESS IN HEAD: NOT PRESENT
AUDITORY DISTURBANCES: NOT PRESENT
AGITATION: MODERATELY FIDGETY AND RESTLESS
ANXIETY: NO ANXIETY, AT EASE
NAUSEA AND VOMITING: NO NAUSEA AND NO VOMITING
HEADACHE, FULLNESS IN HEAD: NOT PRESENT
ANXIETY: 2
AGITATION: SOMEWHAT MORE THAN NORMAL ACTIVITY
HEADACHE, FULLNESS IN HEAD: NOT PRESENT
VISUAL DISTURBANCES: NOT PRESENT
TOTAL SCORE: 10
ANXIETY: NO ANXIETY, AT EASE
TOTAL SCORE: 2
HEADACHE, FULLNESS IN HEAD: NOT PRESENT
BLOOD PRESSURE: 126/90
HEADACHE, FULLNESS IN HEAD: NOT PRESENT
PAROXYSMAL SWEATS: NO SWEAT VISIBLE
VISUAL DISTURBANCES: NOT PRESENT
ANXIETY: NO ANXIETY, AT EASE
VISUAL DISTURBANCES: NOT PRESENT
TREMOR: 2

## 2024-11-14 ASSESSMENT — COGNITIVE AND FUNCTIONAL STATUS - GENERAL
TOILETING: A LITTLE
CLIMB 3 TO 5 STEPS WITH RAILING: TOTAL
WALKING IN HOSPITAL ROOM: A LITTLE
DRESSING REGULAR UPPER BODY CLOTHING: A LITTLE
DAILY ACTIVITIY SCORE: 24
STANDING UP FROM CHAIR USING ARMS: A LITTLE
HELP NEEDED FOR BATHING: A LITTLE
MOVING TO AND FROM BED TO CHAIR: A LITTLE
MOVING FROM LYING ON BACK TO SITTING ON SIDE OF FLAT BED WITH BEDRAILS: A LITTLE
TURNING FROM BACK TO SIDE WHILE IN FLAT BAD: A LITTLE
MOBILITY SCORE: 16
PERSONAL GROOMING: A LITTLE
DRESSING REGULAR LOWER BODY CLOTHING: A LITTLE
MOBILITY SCORE: 24
DAILY ACTIVITIY SCORE: 19

## 2024-11-14 ASSESSMENT — PAIN - FUNCTIONAL ASSESSMENT
PAIN_FUNCTIONAL_ASSESSMENT: 0-10
PAIN_FUNCTIONAL_ASSESSMENT: 0-10

## 2024-11-14 ASSESSMENT — PAIN SCALES - GENERAL
PAINLEVEL_OUTOF10: 4
PAINLEVEL_OUTOF10: 0 - NO PAIN

## 2024-11-14 NOTE — PROGRESS NOTES
Samia met with the Liaison with [facility] whom provides the stepping stones program.  The Liaison met with pt.  Sw to follow up on final dc plan and if he would like to precede with a referral being sent to [facility].  Sw will continue to provide support and services as needed to ensure a safe dc plan is in place.     11:50am- Samia spoke with Katelyn with [facility] and they would like the referral sent over to them in [facility]. Pt has signed all the paperwork to go to their stepping stones program.  Sw verified with pt and he would like the referral sent and he agrees to the plan to go.  Sw had the referral sent.  Pt will also need a precert to go and a precert request was placed.

## 2024-11-14 NOTE — CARE PLAN
The patient's goals for the shift include      The clinical goals for the shift include CIWA q2hr assessment with treatment      Problem: Skin  Goal: Promote/optimize nutrition  Outcome: Progressing     Problem: Fall/Injury  Goal: Not fall by end of shift  Outcome: Progressing  Goal: Be free from injury by end of the shift  Outcome: Progressing  Goal: Verbalize understanding of personal risk factors for fall in the hospital  Outcome: Progressing  Goal: Verbalize understanding of risk factor reduction measures to prevent injury from fall in the home  Outcome: Progressing  Goal: Use assistive devices by end of the shift  Outcome: Progressing  Goal: Pace activities to prevent fatigue by end of the shift  Outcome: Progressing     Problem: Pain  Goal: Takes deep breaths with improved pain control throughout the shift  Outcome: Progressing  Goal: Turns in bed with improved pain control throughout the shift  Outcome: Progressing  Goal: Walks with improved pain control throughout the shift  Outcome: Progressing  Goal: Performs ADL's with improved pain control throughout shift  Outcome: Progressing  Goal: Participates in PT with improved pain control throughout the shift  Outcome: Progressing  Goal: Free from opioid side effects throughout the shift  Outcome: Progressing  Goal: Free from acute confusion related to pain meds throughout the shift  Outcome: Progressing     Problem: Pain - Adult  Goal: Verbalizes/displays adequate comfort level or baseline comfort level  Outcome: Progressing     Problem: Safety - Adult  Goal: Free from fall injury  Outcome: Progressing     Problem: Discharge Planning  Goal: Discharge to home or other facility with appropriate resources  Outcome: Progressing     Problem: Chronic Conditions and Co-morbidities  Goal: Patient's chronic conditions and co-morbidity symptoms are monitored and maintained or improved  Outcome: Progressing

## 2024-11-14 NOTE — CARE PLAN
The patient's goals for the shift include safety      The clinical goals for the shift include q 4 hr CIWA. No withdrawal, no seizures

## 2024-11-14 NOTE — PROGRESS NOTES
11/14/24 1145   Discharge Planning   Living Arrangements Alone   Support Systems Family members   Assistance Needed pt was Independent with mobility and ADLs   Type of Residence Homeless   Do you have animals or pets at home? No   Home or Post Acute Services None   Does the patient need discharge transport arranged? Yes   RoundTrip coordination needed? Yes     TCC Note: SW is assisting with this case. Pt was admitted from home and was living with his Girlfriend. Was found unresponsive at a gas station and was brought here. SW spoke with pt regarding Discharge planning and going to [facility] for their [facility]. Pt is currently very agitated and Security was called. Will defer my Assessment until pt is more stable. Yanna Gibson, MSN, RN, TCC.

## 2024-11-14 NOTE — PROGRESS NOTES
Arjun Ocampo is a 59 y.o. male on day 1 of admission presenting with Alcohol intoxication delirium, with moderate use disorder (Multi).      Subjective   Still feels anxious.       Objective     Last Recorded Vitals  BP (!) 134/96 (BP Location: Left arm, Patient Position: Sitting)   Pulse 90   Temp 36.2 °C (97.2 °F) (Temporal)   Resp 18   Wt 65.8 kg (145 lb)   SpO2 98%   Intake/Output last 3 Shifts:    Intake/Output Summary (Last 24 hours) at 11/14/2024 1330  Last data filed at 11/14/2024 0800  Gross per 24 hour   Intake 118 ml   Output --   Net 118 ml       Admission Weight  Weight: 65.8 kg (145 lb) (11/12/24 1716)    Daily Weight  11/13/24 : 65.8 kg (145 lb)    Image Results  ECG 12 lead  Sinus rhythm  Minimal ST elevation, inferior leads      Physical Exam  Vitals reviewed.   Constitutional:       Appearance: Normal appearance.   HENT:      Head: Normocephalic and atraumatic.      Mouth/Throat:      Mouth: Mucous membranes are moist.   Eyes:      Conjunctiva/sclera: Conjunctivae normal.   Cardiovascular:      Rate and Rhythm: Normal rate.      Pulses: Normal pulses.   Pulmonary:      Effort: Pulmonary effort is normal.      Breath sounds: Normal breath sounds. No wheezing.   Abdominal:      General: Abdomen is flat. There is no distension.      Palpations: Abdomen is soft.      Tenderness: There is no guarding.   Musculoskeletal:         General: No swelling. Normal range of motion.   Skin:     General: Skin is warm and dry.   Neurological:      General: No focal deficit present.      Mental Status: He is alert. Mental status is at baseline.      Comments: Elevated ciwa   Psychiatric:         Mood and Affect: Mood normal.         Behavior: Behavior normal.         Relevant Results               Assessment/Plan                  Assessment & Plan  Alcohol intoxication delirium, with moderate use disorder (Multi)    Alcohol use    Alcohol abuse disorder severe  Acute alcohol  withdrawal  Asthma  Parkinsonism  Tobacco abuse    Nursing reports late yesterday shows seizure.  No post ictal episodes, and unsure if true seizure activity.  Patient was stable to keep on tele floor.  Patient with baseline tremor from his parkinsonism - continue home sinemet  Continue phenobarb taper with ciwa protocol with diazepam  Continue thiamine folate  SW for inpatient placement after detox  Continue home meds  Monitor respiratory status.  Ambulate for DVT prophy  Reg diet  Full code    Abilio Salinas MD

## 2024-11-14 NOTE — PROGRESS NOTES
Physical Therapy    Physical Therapy Evaluation    Patient Name: Arjun Ocampo  MRN: [id number]  Today's Date: 11/14/2024   Time Calculation  Start Time: 1435  Stop Time: 1444  Time Calculation (min): 9 min  322/322-A    Assessment/Plan   PT Assessment  PT Assessment Results: Decreased strength, Decreased endurance, Impaired balance, Decreased mobility, Decreased cognition, Impaired judgement, Decreased safety awareness, Pain  Rehab Prognosis: Good  Evaluation/Treatment Tolerance: Treatment limited secondary to agitation  End of Session Communication: Bedside nurse  Assessment Comment: Pt admitted with acute alcohol withdrawal and demos significant safety concerns, decreased balance and decreased strength. Evaluation limited this date by agitation. Recommend mod intensity therapy.  End of Session Patient Position: Bed, 3 rail up  IP OR SWING BED PT PLAN  Inpatient or Swing Bed: Inpatient  PT Plan  Treatment/Interventions: Bed mobility, Transfer training, Gait training, Stair training, Balance training, Strengthening, Endurance training, Therapeutic exercise, Therapeutic activity, Positioning  PT Plan: Ongoing PT  PT Frequency: 3 times per week  PT Discharge Recommendations: Moderate intensity level of continued care  PT - OK to Discharge: Yes (once medically cleared)    Subjective     Current Problem:  1. Alcohol use          Patient Active Problem List   Diagnosis    Abnormal involuntary movement    Abnormality of gait    Alcohol use disorder, severe, dependence (Multi)    Alcohol withdrawal syndrome without complication (Multi)    Altered mental status    Arthritis, degenerative    Arthritis of right hip    Chronic hepatitis C without hepatic coma (Multi)    COPD (chronic obstructive pulmonary disease) (Multi)    Falls frequently    Fracture    Generalized tonic clonic epilepsy (Multi)    Insomnia    Lack of coordination    Left rotator cuff tear    Major depressive disorder    Major depressive disorder with  "single episode, in partial remission (CMS-HCC)    Mild protein-calorie malnutrition (Multi)    Nicotine use disorder    Pain in joint, lower leg    Parkinson's disease (Multi)    Patellar instability    Peripheral vascular disease (CMS-HCC)    Positive PPD    Generalized anxiety disorder    Post traumatic stress disorder    Presence of right artificial hip joint    Psychosis (Multi)    Total knee replacement status    Alcohol intoxication delirium, with moderate use disorder (Multi)    Alcohol use       General Visit Information:  General  Reason for Referral: PT eval and treat; impaired mobility  Referred By: Sergey Baker PA-C  Past Medical History Relevant to Rehab: Pt admitted 11/12 after being found unconscious at gas station. Pt with acute alcohol withdrawal and history of seizures. Pt has PMH of parkinsonism, asthma and alcohol abuse.  Co-Treatment: OT  Co-Treatment Reason: to maximize patient safety and participation  Prior to Session Communication: Bedside nurse  Patient Position Received: Bed, 2 rail up  General Comment: Pt agitated and agreeable to therapy. XR (R) elbow: ORIF of (R) olecranon, nondisplaced  fracture of the olecranon the age of which is uncertain    Home Living:  Home Living  Home Living Comments: Per chart review patient lives at home with his girlfriend. Pt agitated with questioning and did not give home setup. States he is ind with ADLs and does not drive. No device.    Prior Level of Function:  Prior Function Per Pt/Caregiver Report  Level of [place]: Independent with ADLs and functional transfers    Precautions:  Precautions  Medical Precautions: Fall precautions  Precautions Comment: CIWA     Objective     Pain:  Pain Assessment  Pain Assessment: 0-10 (reports \"I always have pain\". Did not specify where.)    Cognition:  Cognition  Overall Cognitive Status: Impaired (pt easily agitated and perseverating on getting chest x-ray. Pt not oriented to time or situation.)    General " Assessments:      Activity Tolerance  Endurance: Decreased tolerance for upright activites  Sensation  Sensation Comment: denies  Strength  Strength Comments: BLE grossly WFL with mobility     Functional Assessments:     Bed Mobility  Bed Mobility:  (completed supine <> sit with SBA; pt very impulsive)  Transfers  Transfer:  (from EOB to no device with close SBA. Pt agitated and does not allow CGA. Decreased safety.)  Ambulation/Gait Training  Ambulation/Gait Training Performed:  (Pt completed ~40'x1 with increased speed and decreased safety. Tremors in UE and LEs and moderate unsteadiness. CGA for safety.)          Extremity/Trunk Assessments:        RLE   RLE : Within Functional Limits  LLE   LLE : Within Functional Limits    Outcome Measures:     [facility] Basic Mobility  Turning from your back to your side while in a flat bed without using bedrails: A little  Moving from lying on your back to sitting on the side of a flat bed without using bedrails: A little  Moving to and from bed to chair (including a wheelchair): A little  Standing up from a chair using your arms (e.g. wheelchair or bedside chair): A little  To walk in hospital room: A little  Climbing 3-5 steps with railing: Total  Basic Mobility - Total Score: 16     Goals:  Encounter Problems       Encounter Problems (Active)       PT Problem       PT Goal 1 STG - Pt will transition supine <> sitting with MOD I  (Progressing)       Start:  11/14/24    Expected End:  11/28/24            PT Goal 2 STG - Pt will transfer STS with supervision  (Progressing)       Start:  11/14/24    Expected End:  11/28/24            PT Goal 3 STG - Pt will amb 100' using LRD with supervision  (Progressing)       Start:  11/14/24    Expected End:  11/28/24            PT Goal 4 STG - Pt will perform a B LE ther ex program of 2-3 sets of 10  (Progressing)       Start:  11/14/24    Expected End:  11/28/24               Pain - Adult            Education Documentation  Precautions,  taught by Bianca Torres PT at 11/14/2024  3:09 PM.  Learner: Patient  Readiness: Nonacceptance  Method: Explanation  Response: Verbalizes Understanding, Needs Reinforcement    Mobility Training, taught by Bianca Torres PT at 11/14/2024  3:09 PM.  Learner: Patient  Readiness: Nonacceptance  Method: Explanation  Response: Verbalizes Understanding, Needs Reinforcement    Education Comments  No comments found.

## 2024-11-14 NOTE — PROGRESS NOTES
Occupational Therapy    Evaluation    Patient Name: Arjun Ocampo  MRN: [id number]  Department: [facility] 3  Room: [address] [place] [address]  Today's Date: 11/14/2024  Time Calculation  Start Time: 1437  Stop Time: 1447  Time Calculation (min): 10 min        Assessment:  Prognosis: Fair  End of Session Communication: Bedside nurse  End of Session Patient Position: Alarm off, not on at start of session, Bed, 2 rail up  OT Assessment Results: Decreased ADL status, Decreased cognition, Decreased endurance, Decreased functional mobility, Decreased IADLs  Prognosis: Fair  Barriers to Participation: Ability to acquire knowledge, Comorbidities, Coping skills, Insight into problems, Living arrangement secure, Premorbid level of function, Support of Caregivers  Plan:  Treatment Interventions: ADL retraining, Functional transfer training, UE strengthening/ROM, Patient/family training, Equipment evaluation/education, Endurance training  OT Frequency: 3 times per week  OT Discharge Recommendations: Moderate intensity level of continued care (MOD intensity vs drug and alcohol rehab)  OT - OK to Discharge: Yes (from acute OT services to next level of care when medically cleared)  Treatment Interventions: ADL retraining, Functional transfer training, UE strengthening/ROM, Patient/family training, Equipment evaluation/education, Endurance training    Subjective   Current Problem:  1. Alcohol use          General:  General  Reason for Referral: patient to ED after being found unresponsive at gas station, reporting that he had returned to drinking, averages 30-40 beers per day  Referred By: Sergey Baker PA-C  Past Medical History Relevant to Rehab: bronchitis/asthma,CHF, parkinsonism ,nicotine dependence and alcohol abuse  Co-Treatment: PT  Co-Treatment Reason: to facilitate safety and activity tolerance  Prior to Session Communication: Bedside nurse  Patient Position Received: Bed, 2 rail up  General Comment: XR (R) elbow: ORIF of (R) olecranon,  nondisplaced  fracture of the olecranon the age of which is uncertain; CTAP: 5 cm diverticulum at the junction of the second and third portions of  the duodenum.  No associated inflammation.  Fatty liver.  Precautions:  Precautions Comment: Fall Precautions, ETOH withdraw, h/o aggitation/agression    Pain:  Pain Assessment  Pain Assessment: 0-10  0-10 (Numeric) Pain Score:  (states chronic pain 2/2 parkinsons however will not rate or state where)    Objective   Cognition:  Overall Cognitive Status: Within Functional Limits           Home Living:  Home Living Comments: patient will not engage in subjective history, does state he is independent with ADLs, declines driving; per chart he is homeless currently    ADL:  ADL Comments: anticipate MIN A - CGA for LB/standing ADLs, MOD I for feeding    Bed Mobility/Transfers: Bed Mobility  Bed Mobility: Yes  Bed Mobility 1  Bed Mobility Comments 1: Supine <> Sit: MOD I    Transfers  Transfer: Yes (completes functional transfers and household distance mobility without AD; patient is severely unsteady and difficult to know if this is related to ETHO withdraw vs dyskinesia from PD; refuses hands on assist or AD and completes with close CGA)    Strength:  Strength Comments: unable to formally assess however anticipate atleast 4-/5, (R) UE may have restrictions 2/2 recent ORIF    Extremities: RUE   RUE : Within Functional Limits and LUE   LUE: Within Functional Limits      Outcome Measures:[facility] Daily Activity  Putting on and taking off regular lower body clothing: A little  Bathing (including washing, rinsing, drying): A little  Putting on and taking off regular upper body clothing: A little  Toileting, which includes using toilet, bedpan or urinal: A little  Taking care of personal grooming such as brushing teeth: A little  Eating Meals: None  Daily Activity - Total Score: 19        Education Documentation  Body Mechanics, taught by Loyda Tapia, OT at 11/14/2024  3:06  PM.  Learner: Patient  Readiness: Acceptance  Method: Explanation  Response: Verbalizes Understanding    Precautions, taught by Loyda Tapia OT at 11/14/2024  3:06 PM.  Learner: Patient  Readiness: Acceptance  Method: Explanation  Response: Verbalizes Understanding    ADL Training, taught by Loyda Tapia OT at 11/14/2024  3:06 PM.  Learner: Patient  Readiness: Acceptance  Method: Explanation  Response: Verbalizes Understanding    Education Comments  No comments found.        OP EDUCATION:  Education  Individual(s) Educated: Patient  Education Provided: Fall precautons, Ergonomics and postural realignment  Education Comment: will require continued education    Goals:  Encounter Problems       Encounter Problems (Active)       OT Goals       Patient will complete functional transfers at MOD I level with LRD to facilitate increased independence and safety with home going  (Progressing)       Start:  11/14/24    Expected End:  11/28/24            Patient will complete functional mobility at MOD I level with LRD to facilitate increased independence and safety with home going  (Progressing)       Start:  11/14/24    Expected End:  11/28/24            Patient will complete LB dressing at MOD I level to facilitate safety and independence for home going   (Progressing)       Start:  11/14/24    Expected End:  11/28/24            Patient will complete toileting at MOD I level to facilitate safety and independence for home going   (Progressing)       Start:  11/14/24    Expected End:  11/28/24            patient to demonstrate good standing balance seated EOB with unilateral UE support x5 minutes to facilitate carry over with completion of ADLs and feeding  (Progressing)       Start:  11/14/24    Expected End:  11/28/24

## 2024-11-15 LAB
ALBUMIN SERPL BCP-MCNC: 4.1 G/DL (ref 3.4–5)
ANION GAP SERPL CALC-SCNC: 10 MMOL/L (ref 10–20)
ATRIAL RATE: 85 BPM
BUN SERPL-MCNC: 19 MG/DL (ref 6–23)
CALCIUM SERPL-MCNC: 9.4 MG/DL (ref 8.6–10.3)
CHLORIDE SERPL-SCNC: 100 MMOL/L (ref 98–107)
CO2 SERPL-SCNC: 30 MMOL/L (ref 21–32)
CREAT SERPL-MCNC: 0.94 MG/DL (ref 0.5–1.3)
EGFRCR SERPLBLD CKD-EPI 2021: >90 ML/MIN/1.73M*2
ERYTHROCYTE [DISTWIDTH] IN BLOOD BY AUTOMATED COUNT: 13.1 % (ref 11.5–14.5)
GLUCOSE SERPL-MCNC: 84 MG/DL (ref 74–99)
HCT VFR BLD AUTO: 45.7 % (ref 41–52)
HGB BLD-MCNC: 15.4 G/DL (ref 13.5–17.5)
MAGNESIUM SERPL-MCNC: 1.77 MG/DL (ref 1.6–2.4)
MCH RBC QN AUTO: 33.1 PG (ref 26–34)
MCHC RBC AUTO-ENTMCNC: 33.7 G/DL (ref 32–36)
MCV RBC AUTO: 98 FL (ref 80–100)
NRBC BLD-RTO: 0 /100 WBCS (ref 0–0)
P AXIS: 71 DEGREES
PHOSPHATE SERPL-MCNC: 3.3 MG/DL (ref 2.5–4.9)
PLATELET # BLD AUTO: 248 X10*3/UL (ref 150–450)
POTASSIUM SERPL-SCNC: 4.3 MMOL/L (ref 3.5–5.3)
PR INTERVAL: 136 MS
Q ONSET: 249 MS
QRS COUNT: 14 BEATS
QRS DURATION: 87 MS
QT INTERVAL: 356 MS
QTC CALCULATION(BAZETT): 424 MS
QTC FREDERICIA: 399 MS
R AXIS: 63 DEGREES
RBC # BLD AUTO: 4.65 X10*6/UL (ref 4.5–5.9)
SODIUM SERPL-SCNC: 136 MMOL/L (ref 136–145)
T AXIS: 52 DEGREES
T OFFSET: 427 MS
VENTRICULAR RATE: 85 BPM
WBC # BLD AUTO: 8.5 X10*3/UL (ref 4.4–11.3)

## 2024-11-15 PROCEDURE — S4991 NICOTINE PATCH NONLEGEND: HCPCS | Performed by: INTERNAL MEDICINE

## 2024-11-15 PROCEDURE — 94640 AIRWAY INHALATION TREATMENT: CPT

## 2024-11-15 PROCEDURE — 80069 RENAL FUNCTION PANEL: CPT | Performed by: INTERNAL MEDICINE

## 2024-11-15 PROCEDURE — 36415 COLL VENOUS BLD VENIPUNCTURE: CPT | Performed by: INTERNAL MEDICINE

## 2024-11-15 PROCEDURE — 83735 ASSAY OF MAGNESIUM: CPT | Performed by: INTERNAL MEDICINE

## 2024-11-15 PROCEDURE — 2500000001 HC RX 250 WO HCPCS SELF ADMINISTERED DRUGS (ALT 637 FOR MEDICARE OP): Performed by: INTERNAL MEDICINE

## 2024-11-15 PROCEDURE — 1200000002 HC GENERAL ROOM WITH TELEMETRY DAILY

## 2024-11-15 PROCEDURE — 99232 SBSQ HOSP IP/OBS MODERATE 35: CPT | Performed by: INTERNAL MEDICINE

## 2024-11-15 PROCEDURE — 85027 COMPLETE CBC AUTOMATED: CPT | Performed by: INTERNAL MEDICINE

## 2024-11-15 PROCEDURE — 2500000002 HC RX 250 W HCPCS SELF ADMINISTERED DRUGS (ALT 637 FOR MEDICARE OP, ALT 636 FOR OP/ED): Performed by: INTERNAL MEDICINE

## 2024-11-15 PROCEDURE — 2500000001 HC RX 250 WO HCPCS SELF ADMINISTERED DRUGS (ALT 637 FOR MEDICARE OP): Performed by: EMERGENCY MEDICINE

## 2024-11-15 ASSESSMENT — LIFESTYLE VARIABLES
ORIENTATION AND CLOUDING OF SENSORIUM: ORIENTED AND CAN DO SERIAL ADDITIONS
NAUSEA AND VOMITING: NO NAUSEA AND NO VOMITING
ANXIETY: NO ANXIETY, AT EASE
ORIENTATION AND CLOUDING OF SENSORIUM: ORIENTED AND CAN DO SERIAL ADDITIONS
AUDITORY DISTURBANCES: NOT PRESENT
PAROXYSMAL SWEATS: NO SWEAT VISIBLE
AUDITORY DISTURBANCES: NOT PRESENT
TOTAL SCORE: 3
TREMOR: 3
AUDITORY DISTURBANCES: VERY MILD HARSHNESS OR ABILITY TO FRIGHTEN
VISUAL DISTURBANCES: NOT PRESENT
PAROXYSMAL SWEATS: BARELY PERCEPTIBLE SWEATING, PALMS MOIST
TOTAL SCORE: 3
HEADACHE, FULLNESS IN HEAD: NOT PRESENT
BLOOD PRESSURE: 142/80
AGITATION: NORMAL ACTIVITY
HEADACHE, FULLNESS IN HEAD: NOT PRESENT
TACTILE DISTURBANCES: VERY MILD ITCHING, PINS AND NEEDLES, BURNING OR NUMBNESS
AUDITORY DISTURBANCES: VERY MILD HARSHNESS OR ABILITY TO FRIGHTEN
NAUSEA AND VOMITING: NO NAUSEA AND NO VOMITING
TREMOR: NO TREMOR
AGITATION: NORMAL ACTIVITY
BLOOD PRESSURE: 138/74
ANXIETY: NO ANXIETY, AT EASE
VISUAL DISTURBANCES: NOT PRESENT
AGITATION: SOMEWHAT MORE THAN NORMAL ACTIVITY
NAUSEA AND VOMITING: NO NAUSEA AND NO VOMITING
AGITATION: NORMAL ACTIVITY
PAROXYSMAL SWEATS: 2
NAUSEA AND VOMITING: NO NAUSEA AND NO VOMITING
AGITATION: NORMAL ACTIVITY
HEADACHE, FULLNESS IN HEAD: NOT PRESENT
TACTILE DISTURBANCES: VERY MILD ITCHING, PINS AND NEEDLES, BURNING OR NUMBNESS
ANXIETY: MILDLY ANXIOUS
VISUAL DISTURBANCES: NOT PRESENT
PAROXYSMAL SWEATS: NO SWEAT VISIBLE
TOTAL SCORE: 6
TREMOR: NO TREMOR
ORIENTATION AND CLOUDING OF SENSORIUM: ORIENTED AND CAN DO SERIAL ADDITIONS
PULSE: 97
PULSE: 85
TREMOR: NO TREMOR
PAROXYSMAL SWEATS: NO SWEAT VISIBLE
NAUSEA AND VOMITING: NO NAUSEA AND NO VOMITING
TOTAL SCORE: 8
HEADACHE, FULLNESS IN HEAD: NOT PRESENT
VISUAL DISTURBANCES: NOT PRESENT
ANXIETY: MILDLY ANXIOUS
AGITATION: NORMAL ACTIVITY
AGITATION: NORMAL ACTIVITY
AGITATION: 2
NAUSEA AND VOMITING: NO NAUSEA AND NO VOMITING
ANXIETY: MILDLY ANXIOUS
VISUAL DISTURBANCES: VERY MILD SENSITIVITY
PAROXYSMAL SWEATS: 2
ANXIETY: MILDLY ANXIOUS
TACTILE DISTURBANCES: VERY MILD ITCHING, PINS AND NEEDLES, BURNING OR NUMBNESS
TOTAL SCORE: 2
TREMOR: NO TREMOR
NAUSEA AND VOMITING: NO NAUSEA AND NO VOMITING
VISUAL DISTURBANCES: NOT PRESENT
TOTAL SCORE: 2
AUDITORY DISTURBANCES: NOT PRESENT
ORIENTATION AND CLOUDING OF SENSORIUM: ORIENTED AND CAN DO SERIAL ADDITIONS
PAROXYSMAL SWEATS: BARELY PERCEPTIBLE SWEATING, PALMS MOIST
PULSE: 94
VISUAL DISTURBANCES: NOT PRESENT
HEADACHE, FULLNESS IN HEAD: NOT PRESENT
TOTAL SCORE: 3
ANXIETY: NO ANXIETY, AT EASE
AGITATION: NORMAL ACTIVITY
PULSE: 90
ORIENTATION AND CLOUDING OF SENSORIUM: ORIENTED AND CAN DO SERIAL ADDITIONS
TREMOR: NO TREMOR
ANXIETY: 2
VISUAL DISTURBANCES: VERY MILD SENSITIVITY
ORIENTATION AND CLOUDING OF SENSORIUM: ORIENTED AND CAN DO SERIAL ADDITIONS
ORIENTATION AND CLOUDING OF SENSORIUM: ORIENTED AND CAN DO SERIAL ADDITIONS
TREMOR: NO TREMOR
AUDITORY DISTURBANCES: NOT PRESENT
BLOOD PRESSURE: 108/54
HEADACHE, FULLNESS IN HEAD: NOT PRESENT
BLOOD PRESSURE: 127/83
ANXIETY: NO ANXIETY, AT EASE
PAROXYSMAL SWEATS: NO SWEAT VISIBLE
AUDITORY DISTURBANCES: NOT PRESENT
ORIENTATION AND CLOUDING OF SENSORIUM: ORIENTED AND CAN DO SERIAL ADDITIONS
ORIENTATION AND CLOUDING OF SENSORIUM: ORIENTED AND CAN DO SERIAL ADDITIONS
NAUSEA AND VOMITING: NO NAUSEA AND NO VOMITING
VISUAL DISTURBANCES: NOT PRESENT
ORIENTATION AND CLOUDING OF SENSORIUM: ORIENTED AND CAN DO SERIAL ADDITIONS
TREMOR: NO TREMOR
PAROXYSMAL SWEATS: NO SWEAT VISIBLE
TOTAL SCORE: 0
AUDITORY DISTURBANCES: NOT PRESENT
HEADACHE, FULLNESS IN HEAD: NOT PRESENT
AGITATION: MODERATELY FIDGETY AND RESTLESS
TOTAL SCORE: 8
HEADACHE, FULLNESS IN HEAD: NOT PRESENT
TOTAL SCORE: 3
AUDITORY DISTURBANCES: NOT PRESENT
NAUSEA AND VOMITING: NO NAUSEA AND NO VOMITING
NAUSEA AND VOMITING: NO NAUSEA AND NO VOMITING
PAROXYSMAL SWEATS: NO SWEAT VISIBLE
TACTILE DISTURBANCES: VERY MILD ITCHING, PINS AND NEEDLES, BURNING OR NUMBNESS
ANXIETY: 3
TREMOR: 2
TREMOR: 3
AUDITORY DISTURBANCES: NOT PRESENT
VISUAL DISTURBANCES: NOT PRESENT

## 2024-11-15 ASSESSMENT — COGNITIVE AND FUNCTIONAL STATUS - GENERAL
CLIMB 3 TO 5 STEPS WITH RAILING: A LITTLE
MOBILITY SCORE: 19
WALKING IN HOSPITAL ROOM: A LITTLE
DAILY ACTIVITIY SCORE: 22
TURNING FROM BACK TO SIDE WHILE IN FLAT BAD: A LITTLE
MOVING TO AND FROM BED TO CHAIR: A LITTLE
STANDING UP FROM CHAIR USING ARMS: A LITTLE
PERSONAL GROOMING: A LITTLE
TOILETING: A LITTLE

## 2024-11-15 ASSESSMENT — PAIN SCALES - GENERAL
PAINLEVEL_OUTOF10: 9
PAINLEVEL_OUTOF10: 0 - NO PAIN

## 2024-11-15 NOTE — CARE PLAN
The patient's goals for the shift include      The clinical goals for the shift include Q2 CIWA      Problem: Skin  Goal: Promote/optimize nutrition  Outcome: Progressing     Problem: Fall/Injury  Goal: Not fall by end of shift  Outcome: Progressing  Goal: Be free from injury by end of the shift  Outcome: Progressing  Goal: Verbalize understanding of personal risk factors for fall in the hospital  Outcome: Progressing  Goal: Verbalize understanding of risk factor reduction measures to prevent injury from fall in the home  Outcome: Progressing  Goal: Use assistive devices by end of the shift  Outcome: Progressing  Goal: Pace activities to prevent fatigue by end of the shift  Outcome: Progressing     Problem: Pain  Goal: Takes deep breaths with improved pain control throughout the shift  Outcome: Progressing  Goal: Turns in bed with improved pain control throughout the shift  Outcome: Progressing  Goal: Walks with improved pain control throughout the shift  Outcome: Progressing  Goal: Performs ADL's with improved pain control throughout shift  Outcome: Progressing  Goal: Participates in PT with improved pain control throughout the shift  Outcome: Progressing  Goal: Free from opioid side effects throughout the shift  Outcome: Progressing  Goal: Free from acute confusion related to pain meds throughout the shift  Outcome: Progressing     Problem: Pain - Adult  Goal: Verbalizes/displays adequate comfort level or baseline comfort level  Outcome: Progressing     Problem: Safety - Adult  Goal: Free from fall injury  Outcome: Progressing     Problem: Discharge Planning  Goal: Discharge to home or other facility with appropriate resources  Outcome: Progressing     Problem: Chronic Conditions and Co-morbidities  Goal: Patient's chronic conditions and co-morbidity symptoms are monitored and maintained or improved  Outcome: Progressing

## 2024-11-15 NOTE — NURSING NOTE
"RN called security patient was throwing objects in the room, swearing and angry that he says no one brought his lunch or dinner. Security called to de-escalate situation. Person from \"[facility]\" was here assessing patient for discharge etc, and he volunteered to go down to ED and get patient a hot meal. Pts gown, pants and bedding were changed since patient said he was sweaty. Pt later apologized to me and the PCA saying he know it wasn't our faults and apologized for yelling at us. (Actually would be surprised if they did not bring his meals because there is no reason they would not have delivered them??).   "

## 2024-11-15 NOTE — CARE PLAN
The patient's goals for the shift include safety.    The clinical goals for the shift include safety and free from falls during shift.

## 2024-11-15 NOTE — PROGRESS NOTES
Samia spoke with pt and he is still agreeable to go to [facility] under the [facility].  Samia had the PT/OT notes sent to [facility] yesterday for the precert to be started. The precert is still pending at this time. Sw to keep pt updated on the status of the precert.  Samia will continue to provide support and services as needed to ensure a safe dc plan is in place.

## 2024-11-15 NOTE — PROGRESS NOTES
Arjun Ocampo is a 59 y.o. male on day 2 of admission presenting with Alcohol intoxication delirium, with moderate use disorder (Multi).      Subjective   More comfortable today.       Objective     Last Recorded Vitals  /54   Pulse 90   Temp 35.8 °C (96.4 °F) (Temporal)   Resp 16   Wt 65.8 kg (145 lb)   SpO2 98%   Intake/Output last 3 Shifts:    Intake/Output Summary (Last 24 hours) at 11/15/2024 1438  Last data filed at 11/14/2024 1934  Gross per 24 hour   Intake 360 ml   Output --   Net 360 ml       Admission Weight  Weight: 65.8 kg (145 lb) (11/12/24 1716)    Daily Weight  11/13/24 : 65.8 kg (145 lb)    Image Results  ECG 12 lead  Sinus rhythm  Minimal ST elevation, inferior leads      Physical Exam  Vitals reviewed.   Constitutional:       Appearance: Normal appearance.   HENT:      Head: Normocephalic and atraumatic.      Mouth/Throat:      Mouth: Mucous membranes are moist.   Eyes:      Conjunctiva/sclera: Conjunctivae normal.   Cardiovascular:      Rate and Rhythm: Normal rate.      Pulses: Normal pulses.   Pulmonary:      Effort: Pulmonary effort is normal.      Breath sounds: Normal breath sounds. No wheezing.   Abdominal:      General: Abdomen is flat. There is no distension.      Palpations: Abdomen is soft.      Tenderness: There is no guarding.   Musculoskeletal:         General: No swelling. Normal range of motion.   Skin:     General: Skin is warm and dry.   Neurological:      General: No focal deficit present.      Mental Status: He is alert. Mental status is at baseline.      Comments: Elevated ciwa   Psychiatric:         Mood and Affect: Mood normal.         Behavior: Behavior normal.         Relevant Results               Assessment/Plan                  Assessment & Plan  Alcohol intoxication delirium, with moderate use disorder (Multi)    Alcohol use    Alcohol abuse disorder severe  Acute alcohol withdrawal  Asthma  Parkinsonism  Tobacco abuse      More calm today.  Weaning  down scheduled phenobarb.  Continue with additional ciwa diazepam if needed.  Patient with baseline tremor from his parkinsonism - continue home sinemet  Continue thiamine folate  SW for inpatient placement after detox  Continue home meds  Monitor respiratory status.  Ambulate for DVT prophy  Reg diet  Full code    Abilio Salinas MD

## 2024-11-16 PROCEDURE — 2500000001 HC RX 250 WO HCPCS SELF ADMINISTERED DRUGS (ALT 637 FOR MEDICARE OP): Performed by: INTERNAL MEDICINE

## 2024-11-16 PROCEDURE — S4991 NICOTINE PATCH NONLEGEND: HCPCS | Performed by: INTERNAL MEDICINE

## 2024-11-16 PROCEDURE — 1200000002 HC GENERAL ROOM WITH TELEMETRY DAILY

## 2024-11-16 PROCEDURE — 94640 AIRWAY INHALATION TREATMENT: CPT

## 2024-11-16 PROCEDURE — 99233 SBSQ HOSP IP/OBS HIGH 50: CPT | Performed by: INTERNAL MEDICINE

## 2024-11-16 PROCEDURE — 2500000002 HC RX 250 W HCPCS SELF ADMINISTERED DRUGS (ALT 637 FOR MEDICARE OP, ALT 636 FOR OP/ED): Performed by: INTERNAL MEDICINE

## 2024-11-16 PROCEDURE — 2500000001 HC RX 250 WO HCPCS SELF ADMINISTERED DRUGS (ALT 637 FOR MEDICARE OP): Performed by: EMERGENCY MEDICINE

## 2024-11-16 RX ORDER — PHENOBARBITAL 32.4 MG/1
32.4 TABLET ORAL 3 TIMES DAILY
Status: COMPLETED | OUTPATIENT
Start: 2024-11-16 | End: 2024-11-17

## 2024-11-16 ASSESSMENT — LIFESTYLE VARIABLES
VISUAL DISTURBANCES: NOT PRESENT
NAUSEA AND VOMITING: NO NAUSEA AND NO VOMITING
AGITATION: SOMEWHAT MORE THAN NORMAL ACTIVITY
PAROXYSMAL SWEATS: NO SWEAT VISIBLE
ANXIETY: 2
PAROXYSMAL SWEATS: NO SWEAT VISIBLE
AUDITORY DISTURBANCES: NOT PRESENT
TREMOR: NO TREMOR
TACTILE DISTURBANCES: VERY MILD ITCHING, PINS AND NEEDLES, BURNING OR NUMBNESS
HEADACHE, FULLNESS IN HEAD: NOT PRESENT
NAUSEA AND VOMITING: NO NAUSEA AND NO VOMITING
TOTAL SCORE: 4
ORIENTATION AND CLOUDING OF SENSORIUM: ORIENTED AND CAN DO SERIAL ADDITIONS
HEADACHE, FULLNESS IN HEAD: NOT PRESENT
VISUAL DISTURBANCES: NOT PRESENT
AGITATION: SOMEWHAT MORE THAN NORMAL ACTIVITY
NAUSEA AND VOMITING: NO NAUSEA AND NO VOMITING
AUDITORY DISTURBANCES: NOT PRESENT
TREMOR: 3
ORIENTATION AND CLOUDING OF SENSORIUM: ORIENTED AND CAN DO SERIAL ADDITIONS
TOTAL SCORE: 6
AGITATION: SOMEWHAT MORE THAN NORMAL ACTIVITY
ORIENTATION AND CLOUDING OF SENSORIUM: ORIENTED AND CAN DO SERIAL ADDITIONS
ANXIETY: 2
VISUAL DISTURBANCES: NOT PRESENT
PAROXYSMAL SWEATS: NO SWEAT VISIBLE
AUDITORY DISTURBANCES: NOT PRESENT
HEADACHE, FULLNESS IN HEAD: NOT PRESENT
TREMOR: 3
TOTAL SCORE: 6
ANXIETY: 2

## 2024-11-16 ASSESSMENT — PAIN SCALES - GENERAL
PAINLEVEL_OUTOF10: 0 - NO PAIN
PAINLEVEL_OUTOF10: 0 - NO PAIN

## 2024-11-16 NOTE — PROGRESS NOTES
Arjun Ocampo is a 59 y.o. male on day 3 of admission presenting with Alcohol intoxication delirium, with moderate use disorder (Multi).      Subjective   More comfortable today.  Up and moving, and hungry.       Objective     Last Recorded Vitals  /62   Pulse 76   Temp 35.9 °C (96.6 °F)   Resp 18   Wt 65.8 kg (145 lb)   SpO2 96%   Intake/Output last 3 Shifts:  No intake or output data in the 24 hours ending 11/16/24 1053      Admission Weight  Weight: 65.8 kg (145 lb) (11/12/24 1716)    Daily Weight  11/13/24 : 65.8 kg (145 lb)    Image Results  ECG 12 lead  Sinus rhythm  Minimal ST elevation, inferior leads    See ED provider note for full interpretation and clinical correlation  Confirmed by Neftaly Sanches ([id number]) on 11/15/2024 6:28:22 PM      Physical Exam  Vitals reviewed.   Constitutional:       Appearance: Normal appearance.   HENT:      Head: Normocephalic and atraumatic.      Mouth/Throat:      Mouth: Mucous membranes are moist.   Eyes:      Conjunctiva/sclera: Conjunctivae normal.   Cardiovascular:      Rate and Rhythm: Normal rate.      Pulses: Normal pulses.   Pulmonary:      Effort: Pulmonary effort is normal.      Breath sounds: Normal breath sounds. No wheezing.   Abdominal:      General: Abdomen is flat. There is no distension.      Palpations: Abdomen is soft.      Tenderness: There is no guarding.   Musculoskeletal:         General: No swelling. Normal range of motion.   Skin:     General: Skin is warm and dry.   Neurological:      General: No focal deficit present.      Mental Status: He is alert. Mental status is at baseline.   Psychiatric:         Mood and Affect: Mood normal.         Behavior: Behavior normal.         Relevant Results               Assessment/Plan      Assessment & Plan  Alcohol intoxication delirium, with moderate use disorder (Multi)    Alcohol use    Alcohol abuse disorder severe  Acute alcohol withdrawal  Asthma  Parkinsonism  Tobacco abuse      Lower  dose of phenobarb today scheduled.  If no use of prn ciwa expect stable for dc tomorrow.  Encouraging maintaining sobriety, likely for placement at dc pending precert.  Continue with additional ciwa diazepam if needed.  Patient with baseline tremor from his parkinsonism - continue home sinemet  Continue thiamine folate  ambulate for DVT prophy  Reg diet  Full code    Abilio Salinas MD

## 2024-11-16 NOTE — CARE PLAN
The patient's goals for the shift include  to go home     The clinical goals for the shift include [place] will be safe during shift      Problem: Skin  Goal: Promote/optimize nutrition  Outcome: Progressing  Flowsheets (Taken 11/13/2024 0613 by Nadia Hallman RN)  Promote/optimize nutrition: Monitor/record intake including meals     Problem: Skin  Goal: Promote/optimize nutrition  Outcome: Progressing  Flowsheets (Taken 11/13/2024 0613 by Nadia Hallman RN)  Promote/optimize nutrition: Monitor/record intake including meals     Problem: Fall/Injury  Goal: Not fall by end of shift  Outcome: Progressing  Goal: Be free from injury by end of the shift  Outcome: Progressing  Goal: Verbalize understanding of personal risk factors for fall in the hospital  Outcome: Progressing  Goal: Verbalize understanding of risk factor reduction measures to prevent injury from fall in the home  Outcome: Progressing  Goal: Use assistive devices by end of the shift  Outcome: Progressing  Goal: Pace activities to prevent fatigue by end of the shift  Outcome: Progressing     Problem: Pain  Goal: Takes deep breaths with improved pain control throughout the shift  Outcome: Progressing  Goal: Turns in bed with improved pain control throughout the shift  Outcome: Progressing

## 2024-11-17 VITALS
HEIGHT: 65 IN | HEART RATE: 92 BPM | DIASTOLIC BLOOD PRESSURE: 96 MMHG | RESPIRATION RATE: 18 BRPM | SYSTOLIC BLOOD PRESSURE: 138 MMHG | TEMPERATURE: 97.3 F | BODY MASS INDEX: 24.16 KG/M2 | WEIGHT: 145 LBS | OXYGEN SATURATION: 96 %

## 2024-11-17 PROCEDURE — 94640 AIRWAY INHALATION TREATMENT: CPT

## 2024-11-17 PROCEDURE — 2500000001 HC RX 250 WO HCPCS SELF ADMINISTERED DRUGS (ALT 637 FOR MEDICARE OP): Performed by: INTERNAL MEDICINE

## 2024-11-17 PROCEDURE — 2500000001 HC RX 250 WO HCPCS SELF ADMINISTERED DRUGS (ALT 637 FOR MEDICARE OP): Performed by: EMERGENCY MEDICINE

## 2024-11-17 PROCEDURE — 1100000001 HC PRIVATE ROOM DAILY

## 2024-11-17 PROCEDURE — 2500000002 HC RX 250 W HCPCS SELF ADMINISTERED DRUGS (ALT 637 FOR MEDICARE OP, ALT 636 FOR OP/ED): Performed by: INTERNAL MEDICINE

## 2024-11-17 PROCEDURE — S4991 NICOTINE PATCH NONLEGEND: HCPCS | Performed by: INTERNAL MEDICINE

## 2024-11-17 PROCEDURE — 99231 SBSQ HOSP IP/OBS SF/LOW 25: CPT | Performed by: INTERNAL MEDICINE

## 2024-11-17 RX ORDER — ACETAMINOPHEN 325 MG/1
650 TABLET ORAL EVERY 6 HOURS PRN
Status: DISCONTINUED | OUTPATIENT
Start: 2024-11-17 | End: 2024-11-18 | Stop reason: HOSPADM

## 2024-11-17 RX ORDER — OXYCODONE AND ACETAMINOPHEN 5; 325 MG/1; MG/1
1 TABLET ORAL EVERY 6 HOURS PRN
Status: DISCONTINUED | OUTPATIENT
Start: 2024-11-17 | End: 2024-11-18 | Stop reason: HOSPADM

## 2024-11-17 RX ORDER — HYDROMORPHONE HYDROCHLORIDE 2 MG/1
2 TABLET ORAL EVERY 6 HOURS PRN
Status: DISCONTINUED | OUTPATIENT
Start: 2024-11-17 | End: 2024-11-17

## 2024-11-17 RX ORDER — HYDROMORPHONE HYDROCHLORIDE 2 MG/1
1 TABLET ORAL EVERY 6 HOURS PRN
Status: DISCONTINUED | OUTPATIENT
Start: 2024-11-17 | End: 2024-11-18 | Stop reason: HOSPADM

## 2024-11-17 ASSESSMENT — LIFESTYLE VARIABLES
AGITATION: NORMAL ACTIVITY
TREMOR: 3
HEADACHE, FULLNESS IN HEAD: NOT PRESENT
TOTAL SCORE: 4
ORIENTATION AND CLOUDING OF SENSORIUM: ORIENTED AND CAN DO SERIAL ADDITIONS
NAUSEA AND VOMITING: NO NAUSEA AND NO VOMITING
ORIENTATION AND CLOUDING OF SENSORIUM: ORIENTED AND CAN DO SERIAL ADDITIONS
ANXIETY: MILDLY ANXIOUS
PAROXYSMAL SWEATS: NO SWEAT VISIBLE
HEADACHE, FULLNESS IN HEAD: NOT PRESENT
TOTAL SCORE: 4
ANXIETY: MILDLY ANXIOUS
AGITATION: NORMAL ACTIVITY
AUDITORY DISTURBANCES: NOT PRESENT
AUDITORY DISTURBANCES: NOT PRESENT
VISUAL DISTURBANCES: NOT PRESENT
PAROXYSMAL SWEATS: NO SWEAT VISIBLE
VISUAL DISTURBANCES: NOT PRESENT
TREMOR: 3
NAUSEA AND VOMITING: NO NAUSEA AND NO VOMITING

## 2024-11-17 ASSESSMENT — PAIN - FUNCTIONAL ASSESSMENT
PAIN_FUNCTIONAL_ASSESSMENT: 0-10
PAIN_FUNCTIONAL_ASSESSMENT: 0-10

## 2024-11-17 ASSESSMENT — COGNITIVE AND FUNCTIONAL STATUS - GENERAL
MOBILITY SCORE: 24
DAILY ACTIVITIY SCORE: 24

## 2024-11-17 ASSESSMENT — PAIN SCALES - GENERAL
PAINLEVEL_OUTOF10: 0 - NO PAIN
PAINLEVEL_OUTOF10: 9
PAINLEVEL_OUTOF10: 0 - NO PAIN
PAINLEVEL_OUTOF10: 9

## 2024-11-17 ASSESSMENT — PAIN DESCRIPTION - DESCRIPTORS
DESCRIPTORS: OTHER (COMMENT)
DESCRIPTORS: OTHER (COMMENT)

## 2024-11-17 NOTE — PROGRESS NOTES
11/17/24 1251   Discharge Planning   Home or Post Acute Services Post acute facilities (Rehab/SNF/etc)   Type of Post Acute Facility Services Skilled nursing   Expected Discharge Disposition SNF     Received update from [place]: Peer to peer is offered by [facility]: Deadline Monday 11/18/24, 12 noon to call and set up Call [phone]. Message sent to Dr. Salinas with peer to peer information.

## 2024-11-17 NOTE — CARE PLAN
The patient's goals for the shift include  Rest     The clinical goals for the shift include Patient will remain safe during shift      Problem: Skin  Goal: Promote/optimize nutrition  Outcome: Progressing     Problem: Fall/Injury  Goal: Not fall by end of shift  Outcome: Progressing  Goal: Be free from injury by end of the shift  Outcome: Progressing  Goal: Verbalize understanding of personal risk factors for fall in the hospital  Outcome: Progressing  Goal: Verbalize understanding of risk factor reduction measures to prevent injury from fall in the home  Outcome: Progressing  Goal: Use assistive devices by end of the shift  Outcome: Progressing  Goal: Pace activities to prevent fatigue by end of the shift  Outcome: Progressing     Problem: Pain  Goal: Takes deep breaths with improved pain control throughout the shift  Outcome: Progressing  Goal: Turns in bed with improved pain control throughout the shift  Outcome: Progressing  Goal: Walks with improved pain control throughout the shift  Outcome: Progressing  Goal: Performs ADL's with improved pain control throughout shift  Outcome: Progressing  Goal: Participates in PT with improved pain control throughout the shift  Outcome: Progressing  Goal: Free from opioid side effects throughout the shift  Outcome: Progressing  Goal: Free from acute confusion related to pain meds throughout the shift  Outcome: Progressing     Problem: Pain - Adult  Goal: Verbalizes/displays adequate comfort level or baseline comfort level  Outcome: Progressing     Problem: Safety - Adult  Goal: Free from fall injury  Outcome: Progressing     Problem: Discharge Planning  Goal: Discharge to home or other facility with appropriate resources  Outcome: Progressing     Problem: Chronic Conditions and Co-morbidities  Goal: Patient's chronic conditions and co-morbidity symptoms are monitored and maintained or improved  Outcome: Progressing

## 2024-11-17 NOTE — PROGRESS NOTES
Arjun Ocampo is a 59 y.o. male on day 4 of admission presenting with Alcohol intoxication delirium, with moderate use disorder (Multi).      Subjective   Calm, walking halls.         Objective     Last Recorded Vitals  /67 (BP Location: Left arm, Patient Position: Lying)   Pulse 68   Temp 36.1 °C (97 °F) (Temporal)   Resp 18   Wt 65.8 kg (145 lb)   SpO2 95%   Intake/Output last 3 Shifts:  No intake or output data in the 24 hours ending 11/17/24 1353      Admission Weight  Weight: 65.8 kg (145 lb) (11/12/24 1716)    Daily Weight  11/13/24 : 65.8 kg (145 lb)    Image Results  ECG 12 lead  Sinus rhythm  Minimal ST elevation, inferior leads    See ED provider note for full interpretation and clinical correlation  Confirmed by Neftaly Sanches ([id number]) on 11/15/2024 6:28:22 PM      Physical Exam  Vitals reviewed.   Constitutional:       Appearance: Normal appearance.   HENT:      Head: Normocephalic and atraumatic.      Mouth/Throat:      Mouth: Mucous membranes are moist.   Eyes:      Conjunctiva/sclera: Conjunctivae normal.   Cardiovascular:      Rate and Rhythm: Normal rate.      Pulses: Normal pulses.   Pulmonary:      Effort: Pulmonary effort is normal.      Breath sounds: Normal breath sounds. No wheezing.   Abdominal:      General: Abdomen is flat. There is no distension.      Palpations: Abdomen is soft.      Tenderness: There is no guarding.   Musculoskeletal:         General: No swelling. Normal range of motion.   Skin:     General: Skin is warm and dry.   Neurological:      General: No focal deficit present.      Mental Status: He is alert. Mental status is at baseline.   Psychiatric:         Mood and Affect: Mood normal.         Behavior: Behavior normal.         Relevant Results               Assessment/Plan      Assessment & Plan  Alcohol intoxication delirium, with moderate use disorder (Multi)    Alcohol use    Alcohol abuse disorder severe  Acute alcohol  withdrawal  Asthma  Parkinsonism  Tobacco abuse      Last dose of phenobarb today.  OK to remove tele.  Medically detox is complete, and ready for discharge, but facility requires precert now, and Peer to peer.  No one available today to call.    Encouraging maintaining sobriety  Continue with additional ciwa diazepam if needed.  Patient with baseline tremor from his parkinsonism - continue home sinemet  Continue thiamine folate  ambulate for DVT prophy  Reg diet  Full code    Abilio Salinas MD

## 2024-11-18 VITALS
SYSTOLIC BLOOD PRESSURE: 121 MMHG | DIASTOLIC BLOOD PRESSURE: 67 MMHG | OXYGEN SATURATION: 96 % | WEIGHT: 145 LBS | RESPIRATION RATE: 16 BRPM | HEART RATE: 72 BPM | HEIGHT: 65 IN | BODY MASS INDEX: 24.16 KG/M2 | TEMPERATURE: 97.7 F

## 2024-11-18 PROBLEM — Z78.9 ALCOHOL USE: Status: RESOLVED | Noted: 2024-11-13 | Resolved: 2024-11-18

## 2024-11-18 PROCEDURE — 99239 HOSP IP/OBS DSCHRG MGMT >30: CPT | Performed by: INTERNAL MEDICINE

## 2024-11-18 PROCEDURE — 2500000001 HC RX 250 WO HCPCS SELF ADMINISTERED DRUGS (ALT 637 FOR MEDICARE OP): Performed by: INTERNAL MEDICINE

## 2024-11-18 PROCEDURE — S4991 NICOTINE PATCH NONLEGEND: HCPCS | Performed by: INTERNAL MEDICINE

## 2024-11-18 PROCEDURE — 94640 AIRWAY INHALATION TREATMENT: CPT

## 2024-11-18 PROCEDURE — 2500000002 HC RX 250 W HCPCS SELF ADMINISTERED DRUGS (ALT 637 FOR MEDICARE OP, ALT 636 FOR OP/ED): Performed by: INTERNAL MEDICINE

## 2024-11-18 PROCEDURE — 2500000001 HC RX 250 WO HCPCS SELF ADMINISTERED DRUGS (ALT 637 FOR MEDICARE OP): Performed by: EMERGENCY MEDICINE

## 2024-11-18 RX ORDER — IBUPROFEN 200 MG
1 TABLET ORAL DAILY
Qty: 30 PATCH | Refills: 0 | Status: SHIPPED | OUTPATIENT
Start: 2024-11-19 | End: 2024-12-19

## 2024-11-18 RX ORDER — MULTIVIT-MIN/IRON FUM/FOLIC AC 7.5 MG-4
1 TABLET ORAL DAILY
Qty: 30 TABLET | Refills: 0 | Status: SHIPPED | OUTPATIENT
Start: 2024-11-19 | End: 2024-12-19

## 2024-11-18 ASSESSMENT — LIFESTYLE VARIABLES
ANXIETY: NO ANXIETY, AT EASE
ORIENTATION AND CLOUDING OF SENSORIUM: ORIENTED AND CAN DO SERIAL ADDITIONS
TREMOR: 3
PAROXYSMAL SWEATS: NO SWEAT VISIBLE
VISUAL DISTURBANCES: NOT PRESENT
NAUSEA AND VOMITING: NO NAUSEA AND NO VOMITING
HEADACHE, FULLNESS IN HEAD: NOT PRESENT
TOTAL SCORE: 3
AGITATION: NORMAL ACTIVITY
AUDITORY DISTURBANCES: NOT PRESENT

## 2024-11-18 ASSESSMENT — COGNITIVE AND FUNCTIONAL STATUS - GENERAL
MOBILITY SCORE: 24
DAILY ACTIVITIY SCORE: 24

## 2024-11-18 ASSESSMENT — PAIN SCALES - GENERAL: PAINLEVEL_OUTOF10: 0 - NO PAIN

## 2024-11-18 NOTE — CARE PLAN
SW consulted re: [facility] unable to accept pt due to insurance and peer to peer denial.  SW is remote, therefore called to pt to let him know he could not go to the [facility], he was surprised and disappointed.  Per previous JOSE notes, pt has been kicked out of his girlfriend's apartment and is currently unhoused.  Pt agreeable to go to an inpt alcohol rehab program, but states he must be able to smoke there and it has to be close to [place] so his fiance can visit.  Called to [facility] representative in [place] ED, Sharon.  Sharon states there are few options for pt because he wants to be able to smoke and many places do not accept his insurance.  She has suggested [facility], but pt declined as it was too far.  Sharon states pt could go to [facility] or [facility], but he would have to arrange this on his own and these are long term programs and there are long waiting lists.  She also stated pt could go to [facility] in [place] as an outpt.  Care Coordinator gave me info re: [facility].  Called back to pt who declined [facility] as it was too far.  Pt tells me he plans to discharge to the streets.  I offered a men's shelter and he said he would not go to [facility] because his brother was killed there.  Pt said he did not want to go to a shelter, that he would return to the streets and walk to [facility] in [place] where he has been an outpt client for 10 yrs.  Pt declines any other assistance.  Pt is also states he knows where  [facility] is.  Pt anxious to get off phone to get ready to discharge.  No further intervention  planned.

## 2024-11-18 NOTE — DISCHARGE INSTR - APPOINTMENTS
Alcohol Treatment Resources:  [facility] [address], [place] [phone]    [facility] in [facility] [phone]

## 2024-11-18 NOTE — CARE PLAN
Problem: Skin  Goal: Promote/optimize nutrition  Outcome: Progressing     Problem: Fall/Injury  Goal: Not fall by end of shift  Outcome: Progressing     Problem: Fall/Injury  Goal: Be free from injury by end of the shift  Outcome: Progressing     Problem: Pain  Goal: Takes deep breaths with improved pain control throughout the shift  Outcome: Progressing     Problem: Pain - Adult  Goal: Verbalizes/displays adequate comfort level or baseline comfort level  Outcome: Progressing     Problem: Safety - Adult  Goal: Free from fall injury  Outcome: Progressing     Problem: Chronic Conditions and Co-morbidities  Goal: Patient's chronic conditions and co-morbidity symptoms are monitored and maintained or improved  Outcome: Progressing     Problem: Discharge Planning  Goal: Discharge to home or other facility with appropriate resources  Outcome: Progressing

## 2024-11-18 NOTE — DISCHARGE SUMMARY
Discharge Diagnosis  Alcohol intoxication delirium, with moderate use disorder (Multi)    Issues Requiring Follow-Up      Discharge Meds     Medication List      START taking these medications     multivitamin with minerals tablet; Take 1 tablet by mouth once daily.;   Start taking on: November 19, 2024   nicotine 14 mg/24 hr patch; Commonly known as: Nicoderm CQ; Place 1   patch over 24 hours on the skin once daily.; Start taking on: November 19, 2024     CONTINUE taking these medications     albuterol 90 mcg/actuation inhaler   carbidopa-levodopa  mg tablet; Commonly known as: Sinemet   omeprazole 20 mg DR capsule; Commonly known as: PriLOSEC   Spiriva Respimat 2.5 mcg/actuation inhaler; Generic drug: tiotropium;   Inhale 2 puffs once daily.     STOP taking these medications     sildenafil 50 mg tablet; Commonly known as: Viagra       Test Results Pending At Discharge  Pending Labs       No current pending labs.            Hospital Course     59 yom with PMH alcohol abuse disorder severe, parkinsonism, nicotine dependence admitted for etoh detox and falls.  Patient did not have any acute fractures.  Safely detoxed with phenobarbital and medically stable for dc.  Patient ambulating at his baseline and did not require any skilled needs at discharge. Recommended continued sober resources at discharge and SW working on inpatient alcohol living for patient.      Pertinent Physical Exam At Time of Discharge  Physical Exam  Vitals reviewed.   Constitutional:       Appearance: Normal appearance.   HENT:      Head: Normocephalic and atraumatic.      Mouth/Throat:      Mouth: Mucous membranes are moist.   Eyes:      Conjunctiva/sclera: Conjunctivae normal.   Cardiovascular:      Rate and Rhythm: Normal rate.      Pulses: Normal pulses.   Pulmonary:      Effort: Pulmonary effort is normal.      Breath sounds: Normal breath sounds. No wheezing.   Abdominal:      General: Abdomen is flat. There is no distension.       Palpations: Abdomen is soft.      Tenderness: There is no guarding.   Musculoskeletal:         General: No swelling. Normal range of motion.      Comments: Wide based gait   Skin:     General: Skin is warm and dry.   Neurological:      General: No focal deficit present.      Mental Status: He is alert. Mental status is at baseline.   Psychiatric:         Mood and Affect: Mood normal.         Behavior: Behavior normal.         Outpatient Follow-Up  Future Appointments   Date Time Provider Department [place]   11/25/2024  2:45 PM STJ CT 1 [facility]   12/11/2024 12:00 PM Tierra Montano MD [id number] [place]         Abilio Salinas MD

## 2024-11-18 NOTE — CARE PLAN
The patient's goals for the shift include      The clinical goals for the shift include pt will have a decrease in alcohol withdrawal symptoms

## 2024-11-25 ENCOUNTER — APPOINTMENT (OUTPATIENT)
Dept: PULMONOLOGY | Facility: CLINIC | Age: 59
End: 2024-11-25
Payer: MEDICARE

## 2024-12-11 ENCOUNTER — APPOINTMENT (OUTPATIENT)
Dept: PULMONOLOGY | Facility: CLINIC | Age: 59
End: 2024-12-11
Payer: MEDICAID